# Patient Record
Sex: MALE | URBAN - METROPOLITAN AREA
[De-identification: names, ages, dates, MRNs, and addresses within clinical notes are randomized per-mention and may not be internally consistent; named-entity substitution may affect disease eponyms.]

---

## 2023-06-23 ASSESSMENT — ENCOUNTER SYMPTOMS
CONSTIPATION: 0
VOMITING: 0
COUGH: 0
WHEEZING: 0
SHORTNESS OF BREATH: 0
ABDOMINAL PAIN: 0
NAUSEA: 0
ABDOMINAL DISTENTION: 0
PHOTOPHOBIA: 0
DIARRHEA: 0

## 2023-06-26 ENCOUNTER — OFFICE VISIT (OUTPATIENT)
Age: 58
End: 2023-06-26
Payer: COMMERCIAL

## 2023-06-26 VITALS
DIASTOLIC BLOOD PRESSURE: 74 MMHG | HEIGHT: 69 IN | SYSTOLIC BLOOD PRESSURE: 138 MMHG | WEIGHT: 186 LBS | BODY MASS INDEX: 27.55 KG/M2 | HEART RATE: 70 BPM

## 2023-06-26 DIAGNOSIS — Z95.1 HISTORY OF CORONARY ARTERY BYPASS GRAFT X 3: ICD-10-CM

## 2023-06-26 DIAGNOSIS — E78.5 DYSLIPIDEMIA: ICD-10-CM

## 2023-06-26 DIAGNOSIS — I25.10 CORONARY ARTERY DISEASE INVOLVING NATIVE CORONARY ARTERY OF NATIVE HEART WITHOUT ANGINA PECTORIS: Primary | ICD-10-CM

## 2023-06-26 DIAGNOSIS — R07.89 ATYPICAL CHEST PAIN: ICD-10-CM

## 2023-06-26 DIAGNOSIS — R53.82 CHRONIC FATIGUE: ICD-10-CM

## 2023-06-26 PROCEDURE — 99204 OFFICE O/P NEW MOD 45 MIN: CPT | Performed by: INTERNAL MEDICINE

## 2023-06-26 PROCEDURE — 93000 ELECTROCARDIOGRAM COMPLETE: CPT | Performed by: INTERNAL MEDICINE

## 2023-06-26 RX ORDER — NITROGLYCERIN 0.4 MG/1
TABLET SUBLINGUAL
COMMUNITY
Start: 2023-04-26

## 2023-06-26 RX ORDER — FAMOTIDINE 40 MG/1
40 TABLET, FILM COATED ORAL
Qty: 30 TABLET | Refills: 11 | Status: SHIPPED | OUTPATIENT
Start: 2023-06-26

## 2023-06-26 RX ORDER — CLOPIDOGREL BISULFATE 75 MG/1
75 TABLET ORAL DAILY
COMMUNITY
Start: 2023-05-23

## 2023-06-26 RX ORDER — METOPROLOL SUCCINATE 50 MG/1
TABLET, EXTENDED RELEASE ORAL
COMMUNITY
Start: 2023-06-05

## 2023-06-26 RX ORDER — OMEPRAZOLE 40 MG/1
CAPSULE, DELAYED RELEASE ORAL
COMMUNITY
Start: 2023-05-23

## 2023-06-26 RX ORDER — ASPIRIN 81 MG/1
81 TABLET, CHEWABLE ORAL DAILY
COMMUNITY
Start: 2012-01-17

## 2023-06-26 RX ORDER — ROSUVASTATIN CALCIUM 40 MG/1
TABLET, COATED ORAL
COMMUNITY
Start: 2023-04-26

## 2023-06-26 ASSESSMENT — ENCOUNTER SYMPTOMS: CHEST TIGHTNESS: 1

## 2023-07-24 DIAGNOSIS — I25.10 CORONARY ARTERY DISEASE INVOLVING NATIVE CORONARY ARTERY OF NATIVE HEART WITHOUT ANGINA PECTORIS: ICD-10-CM

## 2023-07-24 DIAGNOSIS — Z95.1 HISTORY OF CORONARY ARTERY BYPASS GRAFT X 3: ICD-10-CM

## 2023-07-24 DIAGNOSIS — E78.5 DYSLIPIDEMIA: ICD-10-CM

## 2023-07-24 LAB
ALBUMIN SERPL-MCNC: 4.1 G/DL (ref 3.2–4.6)
ALBUMIN/GLOB SERPL: 1.3 (ref 0.4–1.6)
ALP SERPL-CCNC: 55 U/L (ref 50–136)
ALT SERPL-CCNC: 33 U/L (ref 12–65)
AST SERPL-CCNC: 23 U/L (ref 15–37)
BILIRUB DIRECT SERPL-MCNC: 0.1 MG/DL
BILIRUB SERPL-MCNC: 0.4 MG/DL (ref 0.2–1.1)
CHOLEST SERPL-MCNC: 146 MG/DL
GLOBULIN SER CALC-MCNC: 3.2 G/DL (ref 2.8–4.5)
HDLC SERPL-MCNC: 48 MG/DL (ref 40–60)
HDLC SERPL: 3
LDLC SERPL CALC-MCNC: 54.2 MG/DL
PROT SERPL-MCNC: 7.3 G/DL (ref 6.3–8.2)
TRIGL SERPL-MCNC: 219 MG/DL (ref 35–150)
VLDLC SERPL CALC-MCNC: 43.8 MG/DL (ref 6–23)

## 2023-07-25 ENCOUNTER — TELEPHONE (OUTPATIENT)
Age: 58
End: 2023-07-25

## 2023-07-26 NOTE — TELEPHONE ENCOUNTER
Per Dr. Marina Mitchell, \"Normal heart function and normal blood flow to the heart.   Good news \"    Called pt and informed him of results

## 2023-08-07 ENCOUNTER — TELEPHONE (OUTPATIENT)
Age: 58
End: 2023-08-07

## 2023-08-07 NOTE — TELEPHONE ENCOUNTER
----- Message from Sarah Bonilla MD sent at 8/6/2023 11:49 AM EDT -----  Lipids and liver look good but triglycerides a bit too high. Eat low-carb/low sugar diet, continue to exercise as tolerated, add over-the-counter fish oil in the morning and in the evening and recheck fasting lipids in 3 months.   Keep routine follow-up  ----- Message -----  From: Lyn Morales Incoming Bryant [employer]  Sent: 7/24/2023   3:42 PM EDT  To: Sarah Bonilla MD

## 2023-08-13 ASSESSMENT — ENCOUNTER SYMPTOMS
VOMITING: 0
NAUSEA: 0
PHOTOPHOBIA: 0
SHORTNESS OF BREATH: 0
CONSTIPATION: 0
WHEEZING: 0
COUGH: 0
ABDOMINAL PAIN: 0
DIARRHEA: 0
ABDOMINAL DISTENTION: 0

## 2023-08-13 NOTE — PROGRESS NOTES
[facility] CARDIOLOGY  [address] [facility], [place]  PHONE: [phone]        NAME:  Beatriz Cunha  : 1965  MRN: [id number]     PCP:  Wiliam De Luna MD    SUBJECTIVE:   Beatriz Cunha is a 59 y.o. male seen for a consultation visit regarding the following:     Chief Complaint   Patient presents with    Results     NUKE RESULTS    Coronary Artery Disease        HPI:    He presented recently to establish new cardiac care, previously cared for by Dr. Ludmila Marin at [facility] with a history of bypass grafting and recent stenting last month as noted below. He had bypass 2021 in [facility] with recent PCI in the setting of worsening chest discomfort. He has had prior stenting in . He recently saw Dr. Sierra Marrero with a 4 to 5 months history of accelerating angina and proceeded with left heart catheterization as noted below. Left heart catheterization [facility] 2023:    Severe MVCAD, post bypass anatomy. Severe proximal LCx lesion 70%    Successful PCI with LEORA to the proximal LCx with good result   Right radial artery graft to second marginal small and atretic  LIMA to distal LAD patent  Saphenous vein graft to right-sided posterior descending branch patent    LVEDP 12 mmHg     Dieting and exercising 4-5x weekly since the PCI but still having intermittent SSCP from time to time without associated exacerbating or alleviating factors. He's had a couple of episodes of CP at rest and when in bed, burping and belching with improvement. He is worried about his chest discomfort but this sounds atypical and most likely GI in nature. He has worsening chronic fatigue, daytime somnolence, and his wife reports loud snoring and he reports poor sleep with frequent awakening. He has never had a sleep study. Nuclear stress test 2023:    Stress Combined Conclusion: Normal treadmill myocardial perfusion study at 89 %% PHR.  Findings suggest a

## 2023-08-14 ENCOUNTER — OFFICE VISIT (OUTPATIENT)
Dept: FAMILY MEDICINE CLINIC | Facility: CLINIC | Age: 58
End: 2023-08-14
Payer: COMMERCIAL

## 2023-08-14 VITALS
WEIGHT: 191 LBS | SYSTOLIC BLOOD PRESSURE: 142 MMHG | HEIGHT: 69 IN | DIASTOLIC BLOOD PRESSURE: 90 MMHG | BODY MASS INDEX: 28.29 KG/M2 | HEART RATE: 68 BPM

## 2023-08-14 DIAGNOSIS — F51.04 CHRONIC INSOMNIA: ICD-10-CM

## 2023-08-14 DIAGNOSIS — J30.2 SEASONAL ALLERGIES: ICD-10-CM

## 2023-08-14 DIAGNOSIS — I25.10 CORONARY ARTERY DISEASE INVOLVING NATIVE CORONARY ARTERY OF NATIVE HEART WITHOUT ANGINA PECTORIS: Primary | ICD-10-CM

## 2023-08-14 DIAGNOSIS — M17.11 PRIMARY OSTEOARTHRITIS OF RIGHT KNEE: ICD-10-CM

## 2023-08-14 DIAGNOSIS — Z95.1 HISTORY OF CORONARY ARTERY BYPASS GRAFT X 3: ICD-10-CM

## 2023-08-14 DIAGNOSIS — E78.5 DYSLIPIDEMIA: ICD-10-CM

## 2023-08-14 DIAGNOSIS — Z12.5 SCREENING FOR PROSTATE CANCER: ICD-10-CM

## 2023-08-14 DIAGNOSIS — K21.9 GASTROESOPHAGEAL REFLUX DISEASE WITHOUT ESOPHAGITIS: ICD-10-CM

## 2023-08-14 LAB — PSA SERPL-MCNC: 0.9 NG/ML

## 2023-08-14 PROCEDURE — 99204 OFFICE O/P NEW MOD 45 MIN: CPT | Performed by: FAMILY MEDICINE

## 2023-08-14 RX ORDER — CETIRIZINE HYDROCHLORIDE 10 MG/1
10 TABLET ORAL DAILY
Qty: 90 TABLET | Refills: 3 | Status: SHIPPED | OUTPATIENT
Start: 2023-08-14

## 2023-08-14 RX ORDER — TRAZODONE HYDROCHLORIDE 50 MG/1
50 TABLET ORAL NIGHTLY
Qty: 30 TABLET | Refills: 3 | Status: SHIPPED | OUTPATIENT
Start: 2023-08-14

## 2023-08-14 SDOH — ECONOMIC STABILITY: INCOME INSECURITY: HOW HARD IS IT FOR YOU TO PAY FOR THE VERY BASICS LIKE FOOD, HOUSING, MEDICAL CARE, AND HEATING?: PATIENT DECLINED

## 2023-08-14 SDOH — ECONOMIC STABILITY: FOOD INSECURITY: WITHIN THE PAST 12 MONTHS, THE FOOD YOU BOUGHT JUST DIDN'T LAST AND YOU DIDN'T HAVE MONEY TO GET MORE.: PATIENT DECLINED

## 2023-08-14 SDOH — ECONOMIC STABILITY: FOOD INSECURITY: WITHIN THE PAST 12 MONTHS, YOU WORRIED THAT YOUR FOOD WOULD RUN OUT BEFORE YOU GOT MONEY TO BUY MORE.: PATIENT DECLINED

## 2023-08-14 SDOH — ECONOMIC STABILITY: HOUSING INSECURITY
IN THE LAST 12 MONTHS, WAS THERE A TIME WHEN YOU DID NOT HAVE A STEADY PLACE TO SLEEP OR SLEPT IN A SHELTER (INCLUDING NOW)?: PATIENT REFUSED

## 2023-08-14 ASSESSMENT — ENCOUNTER SYMPTOMS
SINUS PRESSURE: 0
VOMITING: 0
EYE PAIN: 0
COLOR CHANGE: 0
ABDOMINAL PAIN: 1
EYE DISCHARGE: 0
RHINORRHEA: 0
WHEEZING: 0
SORE THROAT: 0
STRIDOR: 0
EYE REDNESS: 0
BLOOD IN STOOL: 0
SHORTNESS OF BREATH: 0
CHEST TIGHTNESS: 0
SINUS PAIN: 0
DIARRHEA: 0
CONSTIPATION: 0
COUGH: 0
NAUSEA: 0
ABDOMINAL DISTENTION: 0
EYE ITCHING: 0
FACIAL SWELLING: 0
BACK PAIN: 0

## 2023-08-14 ASSESSMENT — PATIENT HEALTH QUESTIONNAIRE - PHQ9
SUM OF ALL RESPONSES TO PHQ QUESTIONS 1-9: 0
SUM OF ALL RESPONSES TO PHQ9 QUESTIONS 1 & 2: 0
1. LITTLE INTEREST OR PLEASURE IN DOING THINGS: 0
SUM OF ALL RESPONSES TO PHQ QUESTIONS 1-9: 0
2. FEELING DOWN, DEPRESSED OR HOPELESS: 0

## 2023-08-14 NOTE — PROGRESS NOTES
salt to cooked food), reduce red meat, butter, mayonnaise, egg yolks [<1 egg yolk daily] and dairy products, increase daily exercise [at least 30 sustained minutes 3-5 times weekly])             Relevant Medications    metoprolol succinate (TOPROL XL) 50 MG extended release tablet    rosuvastatin (CRESTOR) 40 MG tablet    nitroGLYCERIN (NITROSTAT) 0.4 MG SL tablet    aspirin 81 MG chewable tablet       Musculoskeletal and Integument    Primary osteoarthritis of right knee     Given hx, will need to see ortho. Relevant Medications    aspirin 81 MG chewable tablet    Other Relevant Orders    [facility] [employer] [facility]       Other    History of coronary artery bypass graft x 3     Asymptomatic today. Will follow along with cards. Dyslipidemia      Drop in LDL. Encouraged strict dietary compliance. Chronic insomnia      New problem. Discussed tx. Pt agrees to trial on trazodone. Relevant Medications    traZODone (DESYREL) 50 MG tablet    Seasonal allergies      New problem. Start daily zyrtec. Consider seasonal singulair.             Relevant Medications    cetirizine (ZYRTEC) 10 MG tablet        Ksenia Flannery III, MD

## 2023-08-14 NOTE — ASSESSMENT & PLAN NOTE
Encouraged compliance with medications.  Stressed importance of low sodium and low fat diet (limit processed foods, fast foods and avoid adding salt to cooked food), reduce red meat, butter, mayonnaise, egg yolks [<1 egg yolk daily] and dairy products, increase daily exercise [at least 30 sustained minutes 3-5 times weekly])

## 2023-08-15 ENCOUNTER — TELEPHONE (OUTPATIENT)
Dept: FAMILY MEDICINE CLINIC | Facility: CLINIC | Age: 58
End: 2023-08-15

## 2023-08-15 NOTE — TELEPHONE ENCOUNTER
Phone call to Bright Richards @[phone] to request colonoscopy results and pathology. Spoke with primo and she said she will fax them over.

## 2023-08-16 ENCOUNTER — OFFICE VISIT (OUTPATIENT)
Age: 58
End: 2023-08-16
Payer: COMMERCIAL

## 2023-08-16 VITALS
HEIGHT: 69 IN | DIASTOLIC BLOOD PRESSURE: 84 MMHG | BODY MASS INDEX: 27.99 KG/M2 | HEART RATE: 76 BPM | SYSTOLIC BLOOD PRESSURE: 128 MMHG | WEIGHT: 189 LBS

## 2023-08-16 DIAGNOSIS — R07.89 ATYPICAL CHEST PAIN: ICD-10-CM

## 2023-08-16 DIAGNOSIS — I25.10 CORONARY ARTERY DISEASE INVOLVING NATIVE CORONARY ARTERY OF NATIVE HEART WITHOUT ANGINA PECTORIS: Primary | ICD-10-CM

## 2023-08-16 DIAGNOSIS — Z95.1 HISTORY OF CORONARY ARTERY BYPASS GRAFT X 3: ICD-10-CM

## 2023-08-16 DIAGNOSIS — E78.5 DYSLIPIDEMIA: ICD-10-CM

## 2023-08-16 PROCEDURE — 99214 OFFICE O/P EST MOD 30 MIN: CPT | Performed by: INTERNAL MEDICINE

## 2023-08-16 ASSESSMENT — ENCOUNTER SYMPTOMS: CHEST TIGHTNESS: 0

## 2023-08-21 ENCOUNTER — TELEPHONE (OUTPATIENT)
Dept: ORTHOPEDIC SURGERY | Age: 58
End: 2023-08-21

## 2023-08-23 ENCOUNTER — HOSPITAL ENCOUNTER (OUTPATIENT)
Dept: GENERAL RADIOLOGY | Age: 58
Discharge: HOME | End: 2023-08-25
Payer: COMMERCIAL

## 2023-08-23 ENCOUNTER — OFFICE VISIT (OUTPATIENT)
Dept: FAMILY MEDICINE CLINIC | Facility: CLINIC | Age: 58
End: 2023-08-23
Payer: COMMERCIAL

## 2023-08-23 VITALS
SYSTOLIC BLOOD PRESSURE: 128 MMHG | HEIGHT: 69 IN | BODY MASS INDEX: 28.58 KG/M2 | WEIGHT: 193 LBS | DIASTOLIC BLOOD PRESSURE: 70 MMHG

## 2023-08-23 DIAGNOSIS — M25.571 ACUTE RIGHT ANKLE PAIN: ICD-10-CM

## 2023-08-23 DIAGNOSIS — M25.571 ACUTE RIGHT ANKLE PAIN: Primary | ICD-10-CM

## 2023-08-23 PROCEDURE — 73610 X-RAY EXAM OF ANKLE: CPT

## 2023-08-23 PROCEDURE — 99214 OFFICE O/P EST MOD 30 MIN: CPT | Performed by: FAMILY MEDICINE

## 2023-08-23 RX ORDER — TRAMADOL HYDROCHLORIDE 50 MG/1
50-100 TABLET ORAL EVERY 6 HOURS PRN
Qty: 18 TABLET | Refills: 0 | Status: SHIPPED | OUTPATIENT
Start: 2023-08-23 | End: 2023-08-26

## 2023-08-23 ASSESSMENT — ENCOUNTER SYMPTOMS
ABDOMINAL PAIN: 0
STRIDOR: 0
RHINORRHEA: 0
CHEST TIGHTNESS: 0
FACIAL SWELLING: 0
VOMITING: 0
BACK PAIN: 0
NAUSEA: 0
CONSTIPATION: 0
WHEEZING: 0
EYE ITCHING: 0
BLOOD IN STOOL: 0
SORE THROAT: 0
EYE PAIN: 0
COLOR CHANGE: 0
SINUS PAIN: 0
SINUS PRESSURE: 0
EYE DISCHARGE: 0
DIARRHEA: 0
SHORTNESS OF BREATH: 0
EYE REDNESS: 0
COUGH: 0
ABDOMINAL DISTENTION: 0

## 2023-08-23 NOTE — PROGRESS NOTES
[facility]  _______________________________________  Hussain Adams MD                 [address], [address] [place] [address]                                                                                    Phone: [phone]                                                                                    Fax: [phone]    Nessa Rosas is a 59 y.o. male who is seen for evaluation of   Chief Complaint   Patient presents with    Foot Pain     Right foot/heal pain X 2 days, no injury known        HPI:   C/o R ankle pain and swelling x 2 days. Pt had a w/o in the gym and mowed the grass prior to onset, but denies any known injuries. He has tried ice that did not resolve the pain or swelling. Worse with getting out of bed and walking. Denies any radiation of pain. Tenderness largely localized to posterior calcaneus at achilles insertion. Review of Systems:  Review of Systems   Constitutional:  Negative for activity change, appetite change, chills, diaphoresis, fatigue, fever and unexpected weight change. HENT:  Negative for congestion, ear discharge, ear pain, facial swelling, hearing loss, mouth sores, nosebleeds, postnasal drip, rhinorrhea, sinus pressure, sinus pain, sore throat and tinnitus. Eyes:  Negative for pain, discharge, redness, itching and visual disturbance. Respiratory:  Negative for cough, chest tightness, shortness of breath, wheezing and stridor. Cardiovascular:  Negative for chest pain, palpitations and leg swelling. Gastrointestinal:  Negative for abdominal distention, abdominal pain, blood in stool, constipation, diarrhea, nausea and vomiting. Endocrine: Negative for cold intolerance, heat intolerance, polydipsia, polyphagia and polyuria. Genitourinary:  Negative for decreased urine volume, difficulty urinating, dysuria, flank pain, frequency, hematuria and urgency.    Musculoskeletal:  Positive

## 2023-08-23 NOTE — ASSESSMENT & PLAN NOTE
Likely a tendinopathy. Check xray. No h/o gout. Will need to consider MRI pending xray, though he has recently had a coronary stent. Instructed to use ACE wrap prn, apply ice for 20 mins prn, tylenol for additional pain relief and will given 3 days of tramadol for severe pain. PDMP queried.

## 2023-08-28 ENCOUNTER — NURSE ONLY (OUTPATIENT)
Dept: FAMILY MEDICINE CLINIC | Facility: CLINIC | Age: 58
End: 2023-08-28

## 2023-08-28 ENCOUNTER — TELEPHONE (OUTPATIENT)
Dept: FAMILY MEDICINE CLINIC | Facility: CLINIC | Age: 58
End: 2023-08-28

## 2023-08-28 DIAGNOSIS — M25.571 ACUTE RIGHT ANKLE PAIN: ICD-10-CM

## 2023-08-28 DIAGNOSIS — M25.571 ACUTE RIGHT ANKLE PAIN: Primary | ICD-10-CM

## 2023-08-28 NOTE — TELEPHONE ENCOUNTER
Pts wife, Delmi Mak, called concerned that Adron Six foot/ankle pain is turning into gout. MRI is scheduled for tomorrow. Per Dr Melvin Diaz, pt was advised to come right over this AM to have a Uric Acid level drawn. Once the results are back we will call pt right away but pt was advised to still keep the MRI just incase. Pt wife expressed verbal understanding.

## 2023-08-29 ENCOUNTER — TELEPHONE (OUTPATIENT)
Dept: FAMILY MEDICINE CLINIC | Facility: CLINIC | Age: 58
End: 2023-08-29

## 2023-08-29 DIAGNOSIS — E79.0 ELEVATED URIC ACID IN BLOOD: ICD-10-CM

## 2023-08-29 DIAGNOSIS — M25.571 ACUTE RIGHT ANKLE PAIN: Primary | ICD-10-CM

## 2023-08-29 LAB — URATE SERPL-MCNC: 7.4 MG/DL (ref 2.6–6)

## 2023-08-29 RX ORDER — COLCHICINE 0.6 MG/1
TABLET ORAL
Qty: 3 TABLET | Refills: 0 | Status: SHIPPED | OUTPATIENT
Start: 2023-08-29

## 2023-08-29 RX ORDER — INDOMETHACIN 50 MG/1
50 CAPSULE ORAL 2 TIMES DAILY PRN
Qty: 10 CAPSULE | Refills: 0 | Status: SHIPPED | OUTPATIENT
Start: 2023-08-29

## 2023-08-29 RX ORDER — PREDNISONE 10 MG/1
30 TABLET ORAL DAILY
Qty: 12 TABLET | Refills: 0 | Status: SHIPPED | OUTPATIENT
Start: 2023-08-29 | End: 2023-09-02

## 2023-08-29 NOTE — TELEPHONE ENCOUNTER
Spoke with pt regarding mildly elevated uric acid. Pt admits that he recently was on vacation and consumed considerably more EtOH than his baseline and has since considerably reduced that. States his pain and swelling are unchanged, but there is purple discoloration at site of achilles insertion. MRI is scheduled for tomorrow. Advised my suspicion is that this is likely a tendinoplathy rather than gout, but given the UA results, will tx as such an instructed to delay mri for another 2 days pending response to tx. I advised to proceed with mri if no improvement in 48 hrs. Pt verbalized understanding.

## 2023-08-31 ENCOUNTER — HOSPITAL ENCOUNTER (OUTPATIENT)
Age: 58
Discharge: HOME | End: 2023-09-02
Payer: COMMERCIAL

## 2023-08-31 DIAGNOSIS — M25.571 ACUTE RIGHT ANKLE PAIN: ICD-10-CM

## 2023-08-31 PROCEDURE — 73721 MRI JNT OF LWR EXTRE W/O DYE: CPT

## 2023-09-01 ENCOUNTER — OFFICE VISIT (OUTPATIENT)
Dept: ORTHOPEDIC SURGERY | Age: 58
End: 2023-09-01

## 2023-09-01 DIAGNOSIS — R52 PAIN: Primary | ICD-10-CM

## 2023-09-01 NOTE — PROGRESS NOTES
Name: Maddy Ramírez  YOB: 1965  Gender: male  MRN: [id number]    Summary: Right insertion achilles tendonitis -medial sided. Suspect gout attack as well. Incidental peroneal tendon brevis tear. , not symptomatic. At this point I do not think there is much to do. I think he has been treated already for his gout. I will see him back in 2 months if needed without x-rays. CC: right Heel pain     HPI: Alayna Hubbard is a 59 y.o. male who presents with increasing posterior ankle pain located at the Achilles tendon region. They do not remember any acute event that started. It has been going on for approximately 2 weeks and has gotten worse. The pain as a burning tearing pain, tender to palpation and pressure with shoe wear, and it limits daily activities. I also described some swelling in this region also. He saw his primary care doctor who ordered uric acid and treating for gout with a prednisone Dosepak. He improved after this but still continued to have pain. MRI was ordered which revealed a peroneal tendon tear. He is referred to me. He describes primarily posterior medial ankle pain. He states he is better than he was 34 was treated for this issue and he states things are actually getting better now. ROS/Meds/PSH/PMH/FH/SH: below is tobacco and diabetes. A full history is at the bottom of the note. Patient Denies fever/chills, headache, visual changes, chest pain, shortness of breath, and nausea/vomiting/diarrhea     Tobacco:  reports that he has never smoked. He has never used smokeless tobacco.  Recent cardiac disease    Physical Examination:  Gen: AAOx3 NAD  Resp: CTAB - no wheezing  Card: RRR  Eyes: sclera non icteric    right Lower Extremity: FROM actively of toes, foot, ankle, knee and hip. No instability of foot or ankle with drawer and stress. 5/5 strength to TA/EHL/GSC/Peroneals/PTib. No skin lesions, Non TTP throughout.   2+ dp pulse w/

## 2023-09-01 NOTE — PROGRESS NOTES
Patient was fitted and instructed on bilateral heel lifts. Patient read and signed documenting they understand and agree to [facility]'s current DME return policy.

## 2023-09-06 ENCOUNTER — OFFICE VISIT (OUTPATIENT)
Dept: ORTHOPEDIC SURGERY | Age: 58
End: 2023-09-06

## 2023-09-06 DIAGNOSIS — M25.561 RIGHT KNEE PAIN, UNSPECIFIED CHRONICITY: Primary | ICD-10-CM

## 2023-09-06 NOTE — PROGRESS NOTES
Name: Andrey Ramírez  YOB: 1965  Gender: male  MRN: [id number]    CC:   Chief Complaint   Patient presents with    Knee Pain     Right knee-getting xrays today         DIAGNOSIS:   Encounter Diagnosis   Name Primary? Right knee pain, unspecified chronicity Yes        HPI:   The pain has been present for years and is becoming worse. It hurts not too much at night when sleeping. The pain is located over the knee. The medial joint line  It does hurt to walk and gets worse with increased distances. The pain does not radiate down the leg. Numbness and tingling are not noted. Treatment so far has been to be injections and anti-inflammatory medications. Nearing 6 months post stent for cardiac issues      Current Outpatient Medications:     traMADol (ULTRAM) 50 MG tablet, Take 2 tablets by mouth every 6 hours as needed for Pain for up to 5 days. Intended supply: 3 days. Take lowest dose possible to manage pain Max Daily Amount: 400 mg, Disp: 40 tablet, Rfl: 0    colchicine (COLCRYS) 0.6 MG tablet, Take 2 tabs now, followed by 1 tab 1 hr later, then stop., Disp: 3 tablet, Rfl: 0    indomethacin (INDOCIN) 50 MG capsule, Take 1 capsule by mouth 2 times daily as needed for Pain (heel pain), Disp: 10 capsule, Rfl: 0    Coenzyme Q10 (COQ10 PO), Take by mouth, Disp: , Rfl:     traZODone (DESYREL) 50 MG tablet, Take 1 tablet by mouth nightly, Disp: 30 tablet, Rfl: 3    cetirizine (ZYRTEC) 10 MG tablet, Take 1 tablet by mouth daily, Disp: 90 tablet, Rfl: 3    metoprolol succinate (TOPROL XL) 50 MG extended release tablet, Take 1 tablet by mouth daily, Disp: , Rfl:     rosuvastatin (CRESTOR) 40 MG tablet, Take 1 tablet by mouth daily, Disp: , Rfl:     omeprazole (PRILOSEC) 40 MG delayed release capsule, , Disp: , Rfl:     nitroGLYCERIN (NITROSTAT) 0.4 MG SL tablet, PLACE ONE TABLET UNDER THE TONGUE AT ONSET OF CHEST PAIN.  MAY REPEAT EVERY 5 MINUTES AS NEEDED FOR CHEST PAIN UP TO 3 TABLETS IN 15

## 2023-09-07 DIAGNOSIS — M25.561 RIGHT KNEE PAIN, UNSPECIFIED CHRONICITY: Primary | ICD-10-CM

## 2023-09-08 ENCOUNTER — TELEPHONE (OUTPATIENT)
Age: 58
End: 2023-09-08

## 2023-09-08 NOTE — TELEPHONE ENCOUNTER
----- Message from Asif Dilloniden, [address] sent at 9/8/2023  8:49 AM EDT -----  Regarding: FW: Hank   Contact: [phone]    ----- Message -----  From: Sheryle Goldberg  Sent: 9/7/2023   5:21 PM EDT  To: Harini Magdaleno Cardiology Clinical Staff  Subject: Gas                                              Well I saw Dr Geovani Jones about this problem because I was getting chest pains.  He prescribed famiotidine and the pains subsided but now they have come back and wanted to know if he wanted to up the dosage because I have been uncomfortable with the on and off pains

## 2023-09-08 NOTE — TELEPHONE ENCOUNTER
,  He sent a Vadio message about gas. I deferred to PCP but he says you have been treating him for the gas w/Pepcid 40mg qhs. Do you want to increase the dose? Defer to PCP? Change the med?

## 2023-09-08 NOTE — TELEPHONE ENCOUNTER
Libby Griffin MD  You 6 minutes ago (12:30 PM)       Increase to twice daily dosing over the weekend but if still having problems on Monday he needs to discuss with his PCP.          Note

## 2023-09-08 NOTE — TELEPHONE ENCOUNTER
Increase to twice daily dosing over the weekend but if still having problems on Monday he needs to discuss with his PCP.

## 2023-09-20 PROBLEM — M17.11 OSTEOARTHRITIS OF RIGHT KNEE: Status: ACTIVE | Noted: 2023-09-07

## 2023-09-30 DIAGNOSIS — F51.04 CHRONIC INSOMNIA: ICD-10-CM

## 2023-10-02 RX ORDER — TRAZODONE HYDROCHLORIDE 50 MG/1
50 TABLET ORAL
Qty: 30 TABLET | Refills: 3 | OUTPATIENT
Start: 2023-10-02

## 2023-10-17 ENCOUNTER — TELEPHONE (OUTPATIENT)
Dept: ORTHOPEDIC SURGERY | Age: 58
End: 2023-10-17

## 2023-10-18 ENCOUNTER — OFFICE VISIT (OUTPATIENT)
Dept: ORTHOPEDIC SURGERY | Age: 58
End: 2023-10-18
Payer: COMMERCIAL

## 2023-10-18 DIAGNOSIS — M17.11 PRIMARY OSTEOARTHRITIS OF RIGHT KNEE: Primary | ICD-10-CM

## 2023-10-18 PROCEDURE — 99215 OFFICE O/P EST HI 40 MIN: CPT | Performed by: ORTHOPAEDIC SURGERY

## 2023-10-18 NOTE — PROGRESS NOTES
Name: Melly Ramírez  YOB: 1965  Gender: male  MRN: [id number]    CC: Right knee pain/transfer care from Dr. Latoya Jaime    HPI: Rasheeda Valentin is a 59 y.o. male who presents with longstanding progressive right knee pain. He describes difficulty getting up and down a sense of insecurity with the knee. He has trouble with stair climbing and other measures. He has had injection therapies in the past without a whole lot of relief. He saw Dr. Latoya Jaime recently with progressive symptomatology and had knee replacement recommended. He was scheduled for Dominique Srinivas robot assisted surgery. He does have a history of coronary artery stenting done in early May at [facility] cardiology. He has had a good recovery from this but does express concern about his cardiac clearance at this time. Is on Plavix    History was obtained from patient    ROS/Meds/PSH/PMH/FH/SH: I personally reviewed the patients standard intake form. Below are the pertinents    Tobacco:  reports that he has never smoked. He has never used smokeless tobacco.  No past medical history on file. Past Surgical History:   Procedure Laterality Date    COLONOSCOPY W/ BIOPSIES N/A     2 yrs ago- unsure of recall    CORONARY ANGIOPLASTY WITH STENT PLACEMENT N/A     3, last 1 month ago. Physical Examination:  Physical exam: On examination of the patient's gait there is antalgia in stance on the right side. and there is varus deformity in the right knee    On examination while standing there is decreased flexion in the lumbosacral spine without acute list or spasm. While seated ,  the Bilateral hip is examined there is full range of motion without obvious issue . On examination of the  Right knee :ROM is 0 to 125 The knee is in varus which corrects with valgus stress to some degree, There is significant tenderness to direct palpation, and There is a mild effusion.  On examination of the  Left knee :ROM is 0 to 125

## 2023-10-19 ENCOUNTER — TELEPHONE (OUTPATIENT)
Age: 58
End: 2023-10-19

## 2023-10-19 NOTE — TELEPHONE ENCOUNTER
Low to moderate risk  Had stenting to LCX May 9th. Nearly 6mo s/p PCI and asymptomatic. Would be best if could wait till mid to late November given guideline recs to wait 6 mos post LEORA PCI, but most likely OK to proceed on 10/27 if he wishes. OK to hold plavix 5-7 days but continue ASA thru procedure without interruption.  Resume plavix post op ASAP, defer to ortho MD

## 2023-10-19 NOTE — TELEPHONE ENCOUNTER
Cardiac Clearance        Physician or Practice Requesting: Dr Jayro Ordoñez: Shania Hart Phone Number: [phone]  Fax Number: [phone]  Date of Surgery/Procedure: 10/27/23  Type of Surgery or Procedure: Rt Total Knee  Type of Anesthesia: Spinal  Type of Clearance Requested: Cardiac Clearance and Medication Hold  Medication to Hold: Plavis  Days to Hold: 7 days prior

## 2023-10-24 ENCOUNTER — HOSPITAL ENCOUNTER (OUTPATIENT)
Dept: REHABILITATION | Age: 58
Discharge: HOME | End: 2023-10-27
Payer: COMMERCIAL

## 2023-10-24 ENCOUNTER — HOSPITAL ENCOUNTER (OUTPATIENT)
Dept: SURGERY | Age: 58
Discharge: HOME | End: 2023-10-27
Payer: COMMERCIAL

## 2023-10-24 VITALS
TEMPERATURE: 98.1 F | DIASTOLIC BLOOD PRESSURE: 81 MMHG | OXYGEN SATURATION: 98 % | WEIGHT: 190 LBS | HEIGHT: 69 IN | BODY MASS INDEX: 28.14 KG/M2 | HEART RATE: 68 BPM | SYSTOLIC BLOOD PRESSURE: 140 MMHG

## 2023-10-24 DIAGNOSIS — Z96.651 STATUS POST RIGHT KNEE REPLACEMENT: Primary | ICD-10-CM

## 2023-10-24 LAB
ANION GAP SERPL CALC-SCNC: 9 MMOL/L (ref 2–11)
BUN SERPL-MCNC: 16 MG/DL (ref 8–23)
CALCIUM SERPL-MCNC: 9 MG/DL (ref 8.3–10.4)
CHLORIDE SERPL-SCNC: 105 MMOL/L (ref 101–110)
CO2 SERPL-SCNC: 28 MMOL/L (ref 21–32)
CREAT SERPL-MCNC: 1.06 MG/DL (ref 0.8–1.5)
ERYTHROCYTE [DISTWIDTH] IN BLOOD BY AUTOMATED COUNT: 13.3 % (ref 11.9–14.6)
GLUCOSE SERPL-MCNC: 112 MG/DL (ref 65–100)
HCT VFR BLD AUTO: 42.1 % (ref 41.1–50.3)
HGB BLD-MCNC: 14.2 G/DL (ref 13.6–17.2)
MCH RBC QN AUTO: 29.3 PG (ref 26.1–32.9)
MCHC RBC AUTO-ENTMCNC: 33.7 G/DL (ref 31.4–35)
MCV RBC AUTO: 86.8 FL (ref 82–102)
MRSA DNA SPEC QL NAA+PROBE: NOT DETECTED
NRBC # BLD: 0 K/UL (ref 0–0.2)
PLATELET # BLD AUTO: 312 K/UL (ref 150–450)
PMV BLD AUTO: 8.8 FL (ref 9.4–12.3)
POTASSIUM SERPL-SCNC: 3.8 MMOL/L (ref 3.5–5.1)
RBC # BLD AUTO: 4.85 M/UL (ref 4.23–5.6)
S AUREUS CPE NOSE QL NAA+PROBE: NOT DETECTED
SODIUM SERPL-SCNC: 142 MMOL/L (ref 133–143)
WBC # BLD AUTO: 6.6 K/UL (ref 4.3–11.1)

## 2023-10-24 PROCEDURE — 97161 PT EVAL LOW COMPLEX 20 MIN: CPT

## 2023-10-24 PROCEDURE — 85027 COMPLETE CBC AUTOMATED: CPT

## 2023-10-24 PROCEDURE — 80048 BASIC METABOLIC PNL TOTAL CA: CPT

## 2023-10-24 PROCEDURE — 94760 N-INVAS EAR/PLS OXIMETRY 1: CPT

## 2023-10-24 PROCEDURE — 87641 MR-STAPH DNA AMP PROBE: CPT

## 2023-10-24 ASSESSMENT — KOOS JR
RISING FROM SITTING: 0
GOING UP OR DOWN STAIRS: 2
BENDING TO THE FLOOR TO PICK UP OBJECT: 2
STANDING UPRIGHT: 2
HOW SEVERE IS YOUR KNEE STIFFNESS AFTER FIRST WAKING IN MORNING: 3
STRAIGHTENING KNEE FULLY: 2
KOOS JR TOTAL INTERVAL SCORE: 52.465
TWISING OR PIVOTING ON KNEE: 3

## 2023-10-24 ASSESSMENT — PROMIS GLOBAL HEALTH SCALE
IN GENERAL, HOW WOULD YOU RATE YOUR MENTAL HEALTH, INCLUDING YOUR MOOD AND YOUR ABILITY TO THINK [ON A SCALE OF 1 (POOR) TO 5 (EXCELLENT)]?: 4
WHO IS THE PERSON COMPLETING THE PROMIS V1.1 SURVEY?: 0
TO WHAT EXTENT ARE YOU ABLE TO CARRY OUT YOUR EVERYDAY PHYSICAL ACTIVITIES SUCH AS WALKING, CLIMBING STAIRS, CARRYING GROCERIES, OR MOVING A CHAIR [ON A SCALE OF 1 (NOT AT ALL) TO 5 (COMPLETELY)]?: 3
IN GENERAL, HOW WOULD YOU RATE YOUR PHYSICAL HEALTH [ON A SCALE OF 1 (POOR) TO 5 (EXCELLENT)]?: 3
IN THE PAST 7 DAYS, HOW OFTEN HAVE YOU BEEN BOTHERED BY EMOTIONAL PROBLEMS, SUCH AS FEELING ANXIOUS, DEPRESSED, OR IRRITABLE [ON A SCALE FROM 1 (NEVER) TO 5 (ALWAYS)]?: 3
SUM OF RESPONSES TO QUESTIONS 2, 4, 5, & 10: 15
SUM OF RESPONSES TO QUESTIONS 3, 6, 7, & 8: 15
IN GENERAL, WOULD YOU SAY YOUR HEALTH IS...[ON A SCALE OF 1 (POOR) TO 5 (EXCELLENT)]: 3
IN GENERAL, PLEASE RATE HOW WELL YOU CARRY OUT YOUR USUAL SOCIAL ACTIVITIES (INCLUDES ACTIVITIES AT HOME, AT WORK, AND IN YOUR COMMUNITY, AND RESPONSIBILITIES AS A PARENT, CHILD, SPOUSE, EMPLOYEE, FRIEND, ETC) [ON A SCALE OF 1 (POOR) TO 5 (EXCELLENT)]?: 4
HOW IS THE PROMIS V1.1 BEING ADMINISTERED?: 0
IN GENERAL, HOW WOULD YOU RATE YOUR SATISFACTION WITH YOUR SOCIAL ACTIVITIES AND RELATIONSHIPS [ON A SCALE OF 1 (POOR) TO 5 (EXCELLENT)]?: 4
IN GENERAL, WOULD YOU SAY YOUR QUALITY OF LIFE IS...[ON A SCALE OF 1 (POOR) TO 5 (EXCELLENT)]: 4
IN THE PAST 7 DAYS, HOW WOULD YOU RATE YOUR PAIN ON AVERAGE [ON A SCALE FROM 0 (NO PAIN) TO 10 (WORST IMAGINABLE PAIN)]?: 5
IN THE PAST 7 DAYS, HOW WOULD YOU RATE YOUR FATIGUE ON AVERAGE [ON A SCALE FROM 1 (NONE) TO 5 (VERY SEVERE)]?: 4

## 2023-10-24 ASSESSMENT — PAIN DESCRIPTION - LOCATION: LOCATION: KNEE

## 2023-10-24 ASSESSMENT — PAIN DESCRIPTION - DESCRIPTORS: DESCRIPTORS: ACHING;BURNING;SHARP

## 2023-10-24 ASSESSMENT — PAIN SCALES - GENERAL: PAINLEVEL_OUTOF10: 9

## 2023-10-24 ASSESSMENT — PULMONARY FUNCTION TESTS
FEV1 (%PREDICTED): 89
FEV1 (LITERS): 2.93

## 2023-10-24 ASSESSMENT — PAIN DESCRIPTION - ORIENTATION: ORIENTATION: RIGHT;ANTERIOR;INNER

## 2023-10-24 NOTE — PERIOP NOTE
PLEASE CONTINUE TAKING ALL PRESCRIPTION MEDICATIONS UP TO THE DAY OF SURGERY UNLESS OTHERWISE DIRECTED BELOW. DISCONTINUE all vitamins, herbals and supplements 3 weeks prior to surgery. DISCONTINUE Non-Steroidal Anti-Inflammatory (NSAIDS) such as Advil, Ibuprofen, Motrin, Naproxen and Aleve 5 days prior to surgery. Home Medications to take  the day of surgery    Omeprazole, Metoprolol, Aspirin 81 mg           Home Medications   to Hold   Hold Plavix for 7 days before surgery. Keep taking Aspirin 81 mg daily while holding Aspirin. Comments   On the day before surgery please take Acetaminophen 2 tablets in the morning and then 2 tablets before bed. Bring Dynahex wash, Incentive Spirometer, omeprazole with you to hospital on the day of surgery. Please do not bring home medications with you on the day of surgery unless otherwise directed by your nurse. If you are instructed to bring home medications, please give them to your nurse as they will be administered by the nursing staff. If you have any questions, please call [address] [place] [address] [phone]. A copy of this note was provided to the patient for reference.

## 2023-10-24 NOTE — PERIOP NOTE
Lab results within anesthesia guidelines, no follow-up required. Labs automatically routed to ordering provider via Epic documentation.       MRSA swab still processing     Latest Reference Range & Units 10/24/23 08:39   Sodium 133 - 143 mmol/L 142   Potassium 3.5 - 5.1 mmol/L 3.8   Chloride 101 - 110 mmol/L 105   CO2 21 - 32 mmol/L 28   BUN,BUNPL 8 - 23 MG/DL 16   Creatinine 0.8 - 1.5 MG/DL 1.06   Anion Gap 2 - 11 mmol/L 9   Est, Glom Filt Rate >60 ml/min/1.73m2 >60   Glucose, Random 65 - 100 mg/dL 112 (H)   CALCIUM, SERUM, 141440 8.3 - 10.4 MG/DL 9.0   WBC 4.3 - 11.1 K/uL 6.6   RBC 4.23 - 5.6 M/uL 4.85   Hemoglobin Quant 13.6 - 17.2 g/dL 14.2   Hematocrit 41.1 - 50.3 % 42.1   MCV 82.0 - 102.0 FL 86.8   MCH 26.1 - 32.9 PG 29.3   MCHC 31.4 - 35.0 g/dL 33.7   MPV 9.4 - 12.3 FL 8.8 (L)   RDW 11.9 - 14.6 % 13.3   Platelet Count 143 - 450 K/uL 312   Nucleated Red Blood Cells 0.0 - 0.2 K/uL 0.00   MRSA/STAPH AUREUS DNA  Rpt   (H): Data is abnormally high  (L): Data is abnormally low  Rpt: View report in Results Review for more information

## 2023-10-24 NOTE — PERIOP NOTE
Patient verified name and . Order for consent IS NOT found in EHR ; patient verified. Type 3 surgery, joint camp assessment complete. Labs per surgeon: MRSA swab ; results processing. No further orders in EHR at time of assessment. Labs per anesthesia protocol: CBC , BMP; results processing. EKG:  dated 23 in EHR is within anesthesia protocols. Cardiology note dated 10/19/23 in EHR states:  \"Low to moderate risk  Had stenting to LCX May 9th. Nearly 6mo s/p PCI and asymptomatic. Would be best if could wait till mid to late November given guideline recs to wait 6 mos post LEORA PCI, but most likely OK to proceed on 10/27 if he wishes. OK to hold plavix 5-7 days but continue ASA thru procedure without interruption. Resume plavix post op ASAP, defer to ortho MD\"      MRSA/MSSA swab collected; pharmacy to review and dose antibiotic as appropriate. Hospital approved surgical skin cleanser and instructions to return bottle on DOS given per hospital policy. Patient provided with handouts including Guide to Surgery, Pain Management, Hand Hygiene, Blood Transfusion Education, and [facility] Brochure. Patient answered medical/surgical history questions at their best of ability. All prior to admission medications documented in [facility]. Original medication prescription bottles were visualized during patient appointment. Patient instructed to hold all vitamins 3 weeks prior to surgery and NSAIDS 5 days prior to surgery. Patient teach back successful and patient demonstrates knowledge of instruction.

## 2023-10-24 NOTE — PROGRESS NOTES
10/24/23 0820   Treatment   Treatment Type Bedside spirometry   Oxygen Therapy/Pulse Ox   O2 Therapy Room air   Pulse 67   SpO2 98 %   Pulse Oximeter Device Mode Intermittent   $Pulse Oximeter $Spot check (single)   Bedside Spirometry   FEV-1/Actual (Liters) 2.93 Liters   FEV-1/Predicted (Liters) 89 Liters     Initial respiratory Assessment completed with pt. Pt was interviewed and evaluated in Joint camp prior to surgery. Patient ID:  Ruth Miguel  [id number]  59 y.o.  1965  Surgeon: Dr. Media Angelucci  Date of Surgery: Tito@[employer]  Procedure: Total Right Knee Arthroplasty  Primary Care Physician: Jj Crocker [phone]  Specialists:     BS:CLEAR      Pt taught proper COUGH technique  IS REVIEWED WITH PT AS WELL AS BENEFITS OF USING IS IN SEDENTARY PTS. DIAPHRAGMATIC BREATHING EXERCISE INSTRUCTIONS GIVEN    History of smoking:   FORMER                 Quit date:         Secondhand smoke:PARENTS    Past procedures with Oxygen desaturation or delayed awakening:DENIES    Past Medical History:   Diagnosis Date    CAD (coronary artery disease)     GERD (gastroesophageal reflux disease)     managed with omeprazole and famotidine    HX OF COVID    Respiratory history:DENIES SOB                                                                  Respiratory meds:  DENIES    FAMILY PRESENT:             NO     PAST SLEEP STUDY:                           DENIES- DR LAWRENCE WANTED TO DO SLEEP STUDY ON PT BUT PT DECLINED  HX OF OMAR:                                        DENIES  OMAR assessment:     DANGERS OF UNTREATED OMAR EXPLAINED TO PT.                                          SLEEPS ON     STOMACH  PHYSICAL EXAM   There is no height or weight on file to calculate BMI.    Vitals:    10/24/23 0820   Pulse: (P) 67   SpO2: (P) 98%     Neck circumference:  43.5    cm    Loud snoring:                                                 YES            Witnessed apnea or wakening gasping or
29.3 26.1 - 32.9 PG    MCHC 33.7 31.4 - 35.0 g/dL    RDW 13.3 11.9 - 14.6 %    Platelets 664 669 - 530 K/uL    MPV 8.8 (L) 9.4 - 12.3 FL    nRBC 0.00 0.0 - 0.2 K/uL   Basic Metabolic Panel    Collection Time: 10/24/23  8:39 AM   Result Value Ref Range    Sodium 142 133 - 143 mmol/L    Potassium 3.8 3.5 - 5.1 mmol/L    Chloride 105 101 - 110 mmol/L    CO2 28 21 - 32 mmol/L    Anion Gap 9 2 - 11 mmol/L    Glucose 112 (H) 65 - 100 mg/dL    BUN 16 8 - 23 MG/DL    Creatinine 1.06 0.8 - 1.5 MG/DL    Est, Glom Filt Rate >60 >60 ml/min/1.73m2    Calcium 9.0 8.3 - 10.4 MG/DL         Problem List:  )  Patient Active Problem List   Diagnosis    History of coronary artery bypass graft x 3    CAD (coronary artery disease)    Dyslipidemia    Atypical chest pain    Primary osteoarthritis of right knee    Chronic insomnia    Seasonal allergies    Acute right ankle pain    Osteoarthritis of right knee       Total Joint Surgery Pre-Assessment Recommendations:           Patient would like to participate in SDD. Continuous saturation monitoring during hospitalization. O2 prn per respiratory protocol.      Signed By: SALLY Torrez - CNP-C    October 24, 2023

## 2023-10-24 NOTE — PROGRESS NOTES
Gaby Frederickjulia  : 1965  Primary: Nikky Wylie Ioana Sc  Secondary:  [facility] at [facility] AT [address], [facility] Johan   [phone]      Physical Therapy Prehab Evaluation Summary:10/24/2023   Time In/Out   PT Charge Capture  Episode     MEDICAL/REFERRING DIAGNOSIS: Unilateral primary osteoarthritis, right knee [M17.11]  REFERRING PHYSICIAN: Phuong Boone MD    Treatment Diagnosis:   Pain in Right Knee (M25.561)  Stiffness of Right Knee, Not elsewhere classified (M25.661)    DATE OF SURGERY: 10/27/23  Assessment:   COMMENTS:  Mr. Adelso Luna is present for a Prehab Physical Therapy Assessment for their upcoming right TKA. They are here alone. After discussing the surgical admission options and discharge plans, they are planning on discharging on day of surgery. He is a pt of Dr. Media Angelucci and Sri Diaz will perform his surgery. He will need DME prior to CT. He will have the support of his wife at CT. PROBLEM LIST:   (Impacting functional limitations):  Mr. Adelso Luna presents with the following lower extremity(s) problems:  Strength  Range of Motion  Home Exercise Program  Pain INTERVENTIONS PLANNED:   (Benefits and precautions of physical therapy have been discussed with the patient.)  Home Exercise Program  Educational Discussion       GOALS: (Goals have been discussed and agreed upon with patient.)  Discharge Goals: Time Frame: 1 Day  Patient will demonstrate independence with a home exercise program designed to increase strength, range of motion, and pain control to minimize functional deficits and optimize patient for total joint replacement. Subjective:   Past Medical History/Comorbidities:   Mr. Adelso Luna  has no past medical history on file. Mr. Adelso Luna  has a past surgical history that includes Colonoscopy w/ biopsies (N/A) and Coronary angioplasty with stent (N/A). Allergies:  Patient has no known allergies.     Current Medications:

## 2023-10-25 ENCOUNTER — TELEPHONE (OUTPATIENT)
Dept: SURGERY | Age: 58
End: 2023-10-25

## 2023-10-25 DIAGNOSIS — F51.04 CHRONIC INSOMNIA: ICD-10-CM

## 2023-10-25 DIAGNOSIS — M17.11 PRIMARY OSTEOARTHRITIS OF RIGHT KNEE: Primary | ICD-10-CM

## 2023-10-25 RX ORDER — OXYCODONE HYDROCHLORIDE 5 MG/1
5-10 TABLET ORAL EVERY 4 HOURS PRN
Qty: 60 TABLET | Refills: 0 | Status: SHIPPED | OUTPATIENT
Start: 2023-10-25 | End: 2023-11-01

## 2023-10-25 RX ORDER — METHOCARBAMOL 750 MG/1
TABLET, FILM COATED ORAL
Qty: 40 TABLET | Refills: 0 | Status: SHIPPED | OUTPATIENT
Start: 2023-10-25

## 2023-10-25 RX ORDER — ONDANSETRON 4 MG/1
4 TABLET, FILM COATED ORAL EVERY 6 HOURS PRN
Qty: 30 TABLET | Refills: 0 | Status: SHIPPED | OUTPATIENT
Start: 2023-10-25

## 2023-10-25 RX ORDER — ASPIRIN 81 MG/1
81 TABLET ORAL 2 TIMES DAILY
Qty: 60 TABLET | Refills: 0 | Status: ON HOLD | OUTPATIENT
Start: 2023-10-25 | End: 2023-10-27 | Stop reason: HOSPADM

## 2023-10-25 RX ORDER — TRAZODONE HYDROCHLORIDE 50 MG/1
50 TABLET ORAL NIGHTLY
Qty: 90 TABLET | Refills: 0 | Status: SHIPPED | OUTPATIENT
Start: 2023-10-25

## 2023-10-25 NOTE — PROGRESS NOTES
Dr. Sharmin Galaviz reviewed chart. No order received. States \"per cardiology clearance note, it would be best to wait until mid November based on the guidelines with regards to holding his Plavix. As long as Candelario Andrade is aware and the patient is willing to accept risks of stopping Plavix early, he can proceed with surgery 10/27. Surgeon's office notified.

## 2023-10-25 NOTE — TELEPHONE ENCOUNTER
Dr. Evan Martins reviewed chart. No order received. States \"per cardiology clearance note, it would be best to wait until mid November based on the guidelines with regards to holding his Plavix. As long as Parker Tao is aware and the patient is willing to accept risks of stopping Plavix early, he can proceed with surgery 10/27.

## 2023-10-25 NOTE — TELEPHONE ENCOUNTER
Last visit 8-23-23  Next visit 12-18-23    Pt requesting #90    Per Jimenez Almazan at [employer] pt has qty 30 available for pickup today but will need refills moving forward.

## 2023-10-26 ENCOUNTER — PREP FOR PROCEDURE (OUTPATIENT)
Dept: ORTHOPEDIC SURGERY | Age: 58
End: 2023-10-26

## 2023-10-26 ENCOUNTER — ANESTHESIA EVENT (OUTPATIENT)
Dept: SURGERY | Age: 58
End: 2023-10-26
Payer: COMMERCIAL

## 2023-10-26 DIAGNOSIS — M17.11 PRIMARY OSTEOARTHRITIS OF RIGHT KNEE: Primary | ICD-10-CM

## 2023-10-26 RX ORDER — ACETAMINOPHEN 500 MG
1000 TABLET ORAL ONCE
Status: CANCELLED | OUTPATIENT
Start: 2023-10-26 | End: 2023-10-26

## 2023-10-26 NOTE — H&P
H&P    Patient ID:  Faby Reardon  [id number]  59 y.o.  1965  Surgeon:  Lisa Mesa MD  Date of Surgery: * No surgery date entered *  Procedure: Robot assisted right Total Knee Arthroplasty  Primary Care Physician: Carrington Sierra MD        Subjective:  Faby Reardon is a 59 y.o. White (non-) male who presents with right knee pain. They have a history of right knee pain for several months. Symptoms worse with walking long distances and relieved with rest. Conservative treatment consisting of  activity modification and injections have not helped. The patient lives with their family. The patients goal after surgery is improved pain and function. Past Medical History:   Diagnosis Date    Atypical chest pain     CAD (coronary artery disease)     Chronic insomnia     Dyslipidemia     GERD (gastroesophageal reflux disease)     managed with omeprazole and famotidine    Osteoarthritis     right knee    Seasonal allergies     Snores       Past Surgical History:   Procedure Laterality Date    COLONOSCOPY W/ BIOPSIES N/A     2 yrs ago- unsure of recall    CORONARY ANGIOPLASTY WITH STENT PLACEMENT N/A     3, last 1 month ago. CORONARY ARTERY BYPASS GRAFT  08/2021    [facility]    HAND SURGERY Left     HIP SURGERY Left     eosinophilic granuloma    KNEE ARTHROSCOPY Right      Family History   Problem Relation Age of Onset    Heart Disease Mother     Heart Disease Father     Heart Disease Brother       Social History     Tobacco Use    Smoking status: Never    Smokeless tobacco: Never   Substance Use Topics    Alcohol use: Yes     Alcohol/week: 6.0 standard drinks of alcohol     Types: 2 Glasses of wine, 2 Cans of beer, 2 Shots of liquor per week     Comment: Social at most       Prior to Admission medications    Medication Sig Start Date End Date Taking?  Authorizing Provider   oxyCODONE (ROXICODONE) 5 MG immediate release tablet Take 1-2 tablets by mouth every 4 hours as needed for Pain for up to 7 days. Max Daily Amount: 60 mg 10/25/23 11/1/23  Dayna Pena MD   methocarbamol (ROBAXIN-750) 750 MG tablet Take 1 tablet by mouth every 6 hours as needed for spasms. 10/25/23   Dayna Pena MD   ondansetron (ZOFRAN) 4 MG tablet Take 1 tablet by mouth every 6 hours as needed for Nausea or Vomiting 10/25/23   Dayna Pena MD   aspirin 81 MG EC tablet Take 1 tablet by mouth 2 times daily 10/25/23   Dayna Pena MD   traZODone (DESYREL) 50 MG tablet Take 1 tablet by mouth nightly 10/25/23   Louie Maria MD   colchicine (COLCRYS) 0.6 MG tablet Take 2 tabs now, followed by 1 tab 1 hr later, then stop. 8/29/23   Louie Maria MD   indomethacin (INDOCIN) 50 MG capsule Take 1 capsule by mouth 2 times daily as needed for Pain (heel pain) 8/29/23   Louie Maria MD   Coenzyme Q10 (COQ10 PO) Take by mouth    Geneva To MD   cetirizine (ZYRTEC) 10 MG tablet Take 1 tablet by mouth daily  Patient not taking: Reported on 10/24/2023 8/14/23   Louie Maria MD   metoprolol succinate (TOPROL XL) 50 MG extended release tablet Take 1 tablet by mouth daily 6/5/23   Geneva To MD   rosuvastatin (CRESTOR) 40 MG tablet Take 1 tablet by mouth at bedtime 4/26/23   Geneva To MD   omeprazole (PRILOSEC) 40 MG delayed release capsule  5/23/23   Geneva To MD   nitroGLYCERIN (NITROSTAT) 0.4 MG SL tablet PLACE ONE TABLET UNDER THE TONGUE AT ONSET OF CHEST PAIN.  MAY REPEAT EVERY 5 MINUTES AS NEEDED FOR CHEST PAIN UP TO 3 TABLETS IN 15 MINUTES. 4/26/23   Geneva To MD   clopidogrel (PLAVIX) 75 MG tablet Take 1 tablet by mouth daily 5/23/23   Geneva To MD   aspirin 81 MG chewable tablet Take 1 tablet by mouth daily 1/17/12   Geneva To MD   famotidine (PEPCID) 40 MG tablet Take 1 tablet by mouth nightly 6/26/23   Rufina Hoffmann MD     No Known

## 2023-10-27 ENCOUNTER — ANESTHESIA (OUTPATIENT)
Dept: SURGERY | Age: 58
End: 2023-10-27
Payer: COMMERCIAL

## 2023-10-27 ENCOUNTER — HOME HEALTH ADMISSION (OUTPATIENT)
Dept: HOME HEALTH SERVICES | Facility: HOME HEALTH | Age: 58
End: 2023-10-27
Payer: COMMERCIAL

## 2023-10-27 ENCOUNTER — HOSPITAL ENCOUNTER (OUTPATIENT)
Age: 58
Discharge: HOME | End: 2023-10-27
Attending: ORTHOPAEDIC SURGERY | Admitting: ORTHOPAEDIC SURGERY
Payer: COMMERCIAL

## 2023-10-27 VITALS
OXYGEN SATURATION: 97 % | BODY MASS INDEX: 28.05 KG/M2 | TEMPERATURE: 98.3 F | HEIGHT: 69 IN | WEIGHT: 189.4 LBS | SYSTOLIC BLOOD PRESSURE: 124 MMHG | HEART RATE: 78 BPM | RESPIRATION RATE: 16 BRPM | DIASTOLIC BLOOD PRESSURE: 70 MMHG

## 2023-10-27 PROBLEM — M17.11 ARTHRITIS OF RIGHT KNEE: Status: ACTIVE | Noted: 2023-10-27

## 2023-10-27 PROCEDURE — 6360000002 HC RX W HCPCS: Performed by: PHYSICIAN ASSISTANT

## 2023-10-27 PROCEDURE — 6360000002 HC RX W HCPCS: Performed by: NURSE ANESTHETIST, CERTIFIED REGISTERED

## 2023-10-27 PROCEDURE — 2500000003 HC RX 250 WO HCPCS: Performed by: NURSE ANESTHETIST, CERTIFIED REGISTERED

## 2023-10-27 PROCEDURE — 6370000000 HC RX 637 (ALT 250 FOR IP): Performed by: PHYSICIAN ASSISTANT

## 2023-10-27 PROCEDURE — 27447 TOTAL KNEE ARTHROPLASTY: CPT | Performed by: ORTHOPAEDIC SURGERY

## 2023-10-27 PROCEDURE — C1776 JOINT DEVICE (IMPLANTABLE): HCPCS | Performed by: ORTHOPAEDIC SURGERY

## 2023-10-27 PROCEDURE — 6370000000 HC RX 637 (ALT 250 FOR IP): Performed by: ANESTHESIOLOGY

## 2023-10-27 PROCEDURE — 20985 CPTR-ASST DIR MS PX: CPT | Performed by: ORTHOPAEDIC SURGERY

## 2023-10-27 PROCEDURE — 27447 TOTAL KNEE ARTHROPLASTY: CPT | Performed by: PHYSICIAN ASSISTANT

## 2023-10-27 PROCEDURE — 97161 PT EVAL LOW COMPLEX 20 MIN: CPT

## 2023-10-27 PROCEDURE — 6360000002 HC RX W HCPCS: Performed by: ORTHOPAEDIC SURGERY

## 2023-10-27 PROCEDURE — 7100000001 HC PACU RECOVERY - ADDTL 15 MIN: Performed by: ORTHOPAEDIC SURGERY

## 2023-10-27 PROCEDURE — 2720000010 HC SURG SUPPLY STERILE: Performed by: ORTHOPAEDIC SURGERY

## 2023-10-27 PROCEDURE — 6360000002 HC RX W HCPCS: Performed by: ANESTHESIOLOGY

## 2023-10-27 PROCEDURE — C1713 ANCHOR/SCREW BN/BN,TIS/BN: HCPCS | Performed by: ORTHOPAEDIC SURGERY

## 2023-10-27 PROCEDURE — 3600000005 HC SURGERY LEVEL 5 BASE: Performed by: ORTHOPAEDIC SURGERY

## 2023-10-27 PROCEDURE — 97530 THERAPEUTIC ACTIVITIES: CPT

## 2023-10-27 PROCEDURE — 2709999900 HC NON-CHARGEABLE SUPPLY: Performed by: ORTHOPAEDIC SURGERY

## 2023-10-27 PROCEDURE — 3700000000 HC ANESTHESIA ATTENDED CARE: Performed by: ORTHOPAEDIC SURGERY

## 2023-10-27 PROCEDURE — 2580000003 HC RX 258: Performed by: ANESTHESIOLOGY

## 2023-10-27 PROCEDURE — 97165 OT EVAL LOW COMPLEX 30 MIN: CPT

## 2023-10-27 PROCEDURE — 2580000003 HC RX 258: Performed by: ORTHOPAEDIC SURGERY

## 2023-10-27 PROCEDURE — 97535 SELF CARE MNGMENT TRAINING: CPT

## 2023-10-27 PROCEDURE — 3700000001 HC ADD 15 MINUTES (ANESTHESIA): Performed by: ORTHOPAEDIC SURGERY

## 2023-10-27 PROCEDURE — 7100000000 HC PACU RECOVERY - FIRST 15 MIN: Performed by: ORTHOPAEDIC SURGERY

## 2023-10-27 PROCEDURE — 2500000003 HC RX 250 WO HCPCS: Performed by: ORTHOPAEDIC SURGERY

## 2023-10-27 PROCEDURE — 3600000015 HC SURGERY LEVEL 5 ADDTL 15MIN: Performed by: ORTHOPAEDIC SURGERY

## 2023-10-27 PROCEDURE — 64447 NJX AA&/STRD FEMORAL NRV IMG: CPT | Performed by: ANESTHESIOLOGY

## 2023-10-27 DEVICE — COMPONENT TOT KNEE CAPPED PRIMARY K2STRYKER] STRYKER CORP]: Type: IMPLANTABLE DEVICE | Status: FUNCTIONAL

## 2023-10-27 DEVICE — CRUCIATE RETAINING FEMORAL
Type: IMPLANTABLE DEVICE | Site: KNEE | Status: FUNCTIONAL
Brand: TRIATHLON

## 2023-10-27 DEVICE — INSERT TIB CS 6 10 MM ARTC POST KNEE BEAR TECHNOLOGY X3: Type: IMPLANTABLE DEVICE | Site: KNEE | Status: FUNCTIONAL

## 2023-10-27 DEVICE — DEPUY CMW 2 FAST SET BONE CEMENT 20G: Type: IMPLANTABLE DEVICE | Site: KNEE | Status: FUNCTIONAL

## 2023-10-27 DEVICE — ASYMMETRIC PATELLA
Type: IMPLANTABLE DEVICE | Site: KNEE | Status: FUNCTIONAL
Brand: TRIATHLON

## 2023-10-27 DEVICE — TIBIAL COMPONENT
Type: IMPLANTABLE DEVICE | Site: KNEE | Status: FUNCTIONAL
Brand: TRIATHLON

## 2023-10-27 RX ORDER — ONDANSETRON 2 MG/ML
INJECTION INTRAMUSCULAR; INTRAVENOUS PRN
Status: DISCONTINUED | OUTPATIENT
Start: 2023-10-27 | End: 2023-10-27 | Stop reason: SDUPTHER

## 2023-10-27 RX ORDER — PROPOFOL 10 MG/ML
INJECTION, EMULSION INTRAVENOUS CONTINUOUS PRN
Status: DISCONTINUED | OUTPATIENT
Start: 2023-10-27 | End: 2023-10-27 | Stop reason: SDUPTHER

## 2023-10-27 RX ORDER — MIDAZOLAM HYDROCHLORIDE 2 MG/2ML
2 INJECTION, SOLUTION INTRAMUSCULAR; INTRAVENOUS
Status: COMPLETED | OUTPATIENT
Start: 2023-10-27 | End: 2023-10-27

## 2023-10-27 RX ORDER — ACETAMINOPHEN 325 MG/1
650 TABLET ORAL EVERY 6 HOURS
Status: DISCONTINUED | OUTPATIENT
Start: 2023-10-27 | End: 2023-10-27 | Stop reason: HOSPADM

## 2023-10-27 RX ORDER — DIPHENHYDRAMINE HYDROCHLORIDE 50 MG/ML
12.5 INJECTION INTRAMUSCULAR; INTRAVENOUS
Status: DISCONTINUED | OUTPATIENT
Start: 2023-10-27 | End: 2023-10-27 | Stop reason: HOSPADM

## 2023-10-27 RX ORDER — ACETAMINOPHEN 325 MG/1
650 TABLET ORAL EVERY 6 HOURS
Status: DISCONTINUED | OUTPATIENT
Start: 2023-10-27 | End: 2023-10-27

## 2023-10-27 RX ORDER — SODIUM CHLORIDE 9 MG/ML
INJECTION, SOLUTION INTRAVENOUS PRN
Status: DISCONTINUED | OUTPATIENT
Start: 2023-10-27 | End: 2023-10-27 | Stop reason: HOSPADM

## 2023-10-27 RX ORDER — SODIUM CHLORIDE, SODIUM LACTATE, POTASSIUM CHLORIDE, CALCIUM CHLORIDE 600; 310; 30; 20 MG/100ML; MG/100ML; MG/100ML; MG/100ML
INJECTION, SOLUTION INTRAVENOUS CONTINUOUS
Status: DISCONTINUED | OUTPATIENT
Start: 2023-10-27 | End: 2023-10-27 | Stop reason: HOSPADM

## 2023-10-27 RX ORDER — KETOROLAC TROMETHAMINE 30 MG/ML
INJECTION, SOLUTION INTRAMUSCULAR; INTRAVENOUS PRN
Status: DISCONTINUED | OUTPATIENT
Start: 2023-10-27 | End: 2023-10-27 | Stop reason: HOSPADM

## 2023-10-27 RX ORDER — DEXAMETHASONE SODIUM PHOSPHATE 10 MG/ML
10 INJECTION INTRAMUSCULAR; INTRAVENOUS EVERY 6 HOURS
Status: COMPLETED | OUTPATIENT
Start: 2023-10-27 | End: 2023-10-27

## 2023-10-27 RX ORDER — METOPROLOL SUCCINATE 25 MG/1
50 TABLET, EXTENDED RELEASE ORAL DAILY
Status: DISCONTINUED | OUTPATIENT
Start: 2023-10-28 | End: 2023-10-27 | Stop reason: HOSPADM

## 2023-10-27 RX ORDER — SODIUM CHLORIDE 0.9 % (FLUSH) 0.9 %
5-40 SYRINGE (ML) INJECTION PRN
Status: DISCONTINUED | OUTPATIENT
Start: 2023-10-27 | End: 2023-10-27 | Stop reason: HOSPADM

## 2023-10-27 RX ORDER — METHOCARBAMOL 750 MG/1
750 TABLET, FILM COATED ORAL 4 TIMES DAILY PRN
Status: DISCONTINUED | OUTPATIENT
Start: 2023-10-27 | End: 2023-10-27 | Stop reason: HOSPADM

## 2023-10-27 RX ORDER — ROPIVACAINE HYDROCHLORIDE 2 MG/ML
INJECTION, SOLUTION EPIDURAL; INFILTRATION; PERINEURAL PRN
Status: DISCONTINUED | OUTPATIENT
Start: 2023-10-27 | End: 2023-10-27 | Stop reason: HOSPADM

## 2023-10-27 RX ORDER — ONDANSETRON 2 MG/ML
4 INJECTION INTRAMUSCULAR; INTRAVENOUS
Status: COMPLETED | OUTPATIENT
Start: 2023-10-27 | End: 2023-10-27

## 2023-10-27 RX ORDER — FAMOTIDINE 20 MG/1
40 TABLET, FILM COATED ORAL
Status: DISCONTINUED | OUTPATIENT
Start: 2023-10-27 | End: 2023-10-27 | Stop reason: HOSPADM

## 2023-10-27 RX ORDER — NALOXONE HYDROCHLORIDE 0.4 MG/ML
0.4 INJECTION, SOLUTION INTRAMUSCULAR; INTRAVENOUS; SUBCUTANEOUS PRN
Status: DISCONTINUED | OUTPATIENT
Start: 2023-10-27 | End: 2023-10-27 | Stop reason: HOSPADM

## 2023-10-27 RX ORDER — OXYCODONE HYDROCHLORIDE 5 MG/1
5 TABLET ORAL EVERY 4 HOURS PRN
Status: DISCONTINUED | OUTPATIENT
Start: 2023-10-27 | End: 2023-10-27 | Stop reason: HOSPADM

## 2023-10-27 RX ORDER — MAGNESIUM HYDROXIDE/ALUMINUM HYDROXICE/SIMETHICONE 120; 1200; 1200 MG/30ML; MG/30ML; MG/30ML
15 SUSPENSION ORAL EVERY 6 HOURS PRN
Status: DISCONTINUED | OUTPATIENT
Start: 2023-10-27 | End: 2023-10-27 | Stop reason: HOSPADM

## 2023-10-27 RX ORDER — PROCHLORPERAZINE EDISYLATE 5 MG/ML
5 INJECTION INTRAMUSCULAR; INTRAVENOUS
Status: DISCONTINUED | OUTPATIENT
Start: 2023-10-27 | End: 2023-10-27 | Stop reason: HOSPADM

## 2023-10-27 RX ORDER — ASPIRIN 81 MG/1
81 TABLET ORAL 2 TIMES DAILY
Status: DISCONTINUED | OUTPATIENT
Start: 2023-10-27 | End: 2023-10-27 | Stop reason: HOSPADM

## 2023-10-27 RX ORDER — TRANEXAMIC ACID 100 MG/ML
INJECTION, SOLUTION INTRAVENOUS PRN
Status: DISCONTINUED | OUTPATIENT
Start: 2023-10-27 | End: 2023-10-27 | Stop reason: SDUPTHER

## 2023-10-27 RX ORDER — OXYCODONE HYDROCHLORIDE 5 MG/1
5 TABLET ORAL
Status: COMPLETED | OUTPATIENT
Start: 2023-10-27 | End: 2023-10-27

## 2023-10-27 RX ORDER — DEXAMETHASONE SODIUM PHOSPHATE 10 MG/ML
INJECTION, SOLUTION INTRAMUSCULAR; INTRAVENOUS
Status: COMPLETED | OUTPATIENT
Start: 2023-10-27 | End: 2023-10-27

## 2023-10-27 RX ORDER — SODIUM CHLORIDE 0.9 % (FLUSH) 0.9 %
5-40 SYRINGE (ML) INJECTION EVERY 12 HOURS SCHEDULED
Status: DISCONTINUED | OUTPATIENT
Start: 2023-10-27 | End: 2023-10-27 | Stop reason: HOSPADM

## 2023-10-27 RX ORDER — DEXTROSE MONOHYDRATE 100 MG/ML
INJECTION, SOLUTION INTRAVENOUS CONTINUOUS PRN
Status: DISCONTINUED | OUTPATIENT
Start: 2023-10-27 | End: 2023-10-27 | Stop reason: HOSPADM

## 2023-10-27 RX ORDER — NITROGLYCERIN 0.4 MG/1
0.4 TABLET SUBLINGUAL EVERY 5 MIN PRN
Status: DISCONTINUED | OUTPATIENT
Start: 2023-10-27 | End: 2023-10-27 | Stop reason: HOSPADM

## 2023-10-27 RX ORDER — EPHEDRINE SULFATE/0.9% NACL/PF 50 MG/5 ML
SYRINGE (ML) INTRAVENOUS PRN
Status: DISCONTINUED | OUTPATIENT
Start: 2023-10-27 | End: 2023-10-27 | Stop reason: SDUPTHER

## 2023-10-27 RX ORDER — PANTOPRAZOLE SODIUM 40 MG/1
40 TABLET, DELAYED RELEASE ORAL
Status: DISCONTINUED | OUTPATIENT
Start: 2023-10-28 | End: 2023-10-27 | Stop reason: HOSPADM

## 2023-10-27 RX ORDER — ROSUVASTATIN CALCIUM 20 MG/1
40 TABLET, COATED ORAL NIGHTLY
Status: DISCONTINUED | OUTPATIENT
Start: 2023-10-27 | End: 2023-10-27 | Stop reason: HOSPADM

## 2023-10-27 RX ORDER — OXYCODONE HYDROCHLORIDE 5 MG/1
10 TABLET ORAL EVERY 4 HOURS PRN
Status: DISCONTINUED | OUTPATIENT
Start: 2023-10-27 | End: 2023-10-27 | Stop reason: HOSPADM

## 2023-10-27 RX ORDER — LIDOCAINE HYDROCHLORIDE 10 MG/ML
1 INJECTION, SOLUTION INFILTRATION; PERINEURAL
Status: DISCONTINUED | OUTPATIENT
Start: 2023-10-27 | End: 2023-10-27 | Stop reason: HOSPADM

## 2023-10-27 RX ORDER — TRAZODONE HYDROCHLORIDE 50 MG/1
50 TABLET ORAL NIGHTLY
Status: DISCONTINUED | OUTPATIENT
Start: 2023-10-27 | End: 2023-10-27 | Stop reason: HOSPADM

## 2023-10-27 RX ORDER — SODIUM CHLORIDE 9 MG/ML
INJECTION, SOLUTION INTRAVENOUS CONTINUOUS
Status: DISCONTINUED | OUTPATIENT
Start: 2023-10-27 | End: 2023-10-27 | Stop reason: HOSPADM

## 2023-10-27 RX ORDER — ALBUTEROL SULFATE 2.5 MG/3ML
2.5 SOLUTION RESPIRATORY (INHALATION) EVERY 6 HOURS PRN
Status: DISCONTINUED | OUTPATIENT
Start: 2023-10-27 | End: 2023-10-27 | Stop reason: HOSPADM

## 2023-10-27 RX ORDER — DIPHENHYDRAMINE HYDROCHLORIDE 50 MG/ML
25 INJECTION INTRAMUSCULAR; INTRAVENOUS EVERY 6 HOURS PRN
Status: DISCONTINUED | OUTPATIENT
Start: 2023-10-27 | End: 2023-10-27 | Stop reason: HOSPADM

## 2023-10-27 RX ORDER — FENTANYL CITRATE 50 UG/ML
100 INJECTION, SOLUTION INTRAMUSCULAR; INTRAVENOUS
Status: COMPLETED | OUTPATIENT
Start: 2023-10-27 | End: 2023-10-27

## 2023-10-27 RX ORDER — ACETAMINOPHEN 500 MG
1000 TABLET ORAL ONCE
Status: DISCONTINUED | OUTPATIENT
Start: 2023-10-27 | End: 2023-10-27 | Stop reason: HOSPADM

## 2023-10-27 RX ORDER — SENNA AND DOCUSATE SODIUM 50; 8.6 MG/1; MG/1
1 TABLET, FILM COATED ORAL 2 TIMES DAILY
Status: DISCONTINUED | OUTPATIENT
Start: 2023-10-27 | End: 2023-10-27 | Stop reason: HOSPADM

## 2023-10-27 RX ORDER — MIDAZOLAM HYDROCHLORIDE 1 MG/ML
INJECTION INTRAMUSCULAR; INTRAVENOUS PRN
Status: DISCONTINUED | OUTPATIENT
Start: 2023-10-27 | End: 2023-10-27 | Stop reason: SDUPTHER

## 2023-10-27 RX ORDER — ACETAMINOPHEN 500 MG
1000 TABLET ORAL ONCE
Status: DISCONTINUED | OUTPATIENT
Start: 2023-10-27 | End: 2023-10-27

## 2023-10-27 RX ORDER — DIPHENHYDRAMINE HCL 25 MG
25 CAPSULE ORAL EVERY 6 HOURS PRN
Status: DISCONTINUED | OUTPATIENT
Start: 2023-10-27 | End: 2023-10-27 | Stop reason: HOSPADM

## 2023-10-27 RX ORDER — PROMETHAZINE HYDROCHLORIDE 25 MG/1
25 TABLET ORAL EVERY 6 HOURS PRN
Status: DISCONTINUED | OUTPATIENT
Start: 2023-10-27 | End: 2023-10-27 | Stop reason: HOSPADM

## 2023-10-27 RX ORDER — ONDANSETRON 2 MG/ML
4 INJECTION INTRAMUSCULAR; INTRAVENOUS EVERY 6 HOURS PRN
Status: DISCONTINUED | OUTPATIENT
Start: 2023-10-27 | End: 2023-10-27 | Stop reason: HOSPADM

## 2023-10-27 RX ADMIN — MIDAZOLAM 2 MG: 1 INJECTION INTRAMUSCULAR; INTRAVENOUS at 10:15

## 2023-10-27 RX ADMIN — FENTANYL CITRATE 100 MCG: 50 INJECTION INTRAMUSCULAR; INTRAVENOUS at 10:15

## 2023-10-27 RX ADMIN — ONDANSETRON 4 MG: 2 INJECTION INTRAMUSCULAR; INTRAVENOUS at 10:35

## 2023-10-27 RX ADMIN — OXYCODONE HYDROCHLORIDE 5 MG: 5 TABLET ORAL at 12:25

## 2023-10-27 RX ADMIN — MIDAZOLAM 2 MG: 1 INJECTION INTRAMUSCULAR; INTRAVENOUS at 10:35

## 2023-10-27 RX ADMIN — PHENYLEPHRINE HYDROCHLORIDE 50 MCG: 0.1 INJECTION, SOLUTION INTRAVENOUS at 11:01

## 2023-10-27 RX ADMIN — PHENYLEPHRINE HYDROCHLORIDE 50 MCG: 0.1 INJECTION, SOLUTION INTRAVENOUS at 11:26

## 2023-10-27 RX ADMIN — Medication 2000 MG: at 10:42

## 2023-10-27 RX ADMIN — PHENYLEPHRINE HYDROCHLORIDE 50 MCG: 0.1 INJECTION, SOLUTION INTRAVENOUS at 11:19

## 2023-10-27 RX ADMIN — PHENYLEPHRINE HYDROCHLORIDE 50 MCG: 0.1 INJECTION, SOLUTION INTRAVENOUS at 11:08

## 2023-10-27 RX ADMIN — PROPOFOL 100 MCG/KG/MIN: 10 INJECTION, EMULSION INTRAVENOUS at 10:35

## 2023-10-27 RX ADMIN — SODIUM CHLORIDE, SODIUM LACTATE, POTASSIUM CHLORIDE, AND CALCIUM CHLORIDE: 600; 310; 30; 20 INJECTION, SOLUTION INTRAVENOUS at 11:26

## 2023-10-27 RX ADMIN — PHENYLEPHRINE HYDROCHLORIDE 50 MCG: 0.1 INJECTION, SOLUTION INTRAVENOUS at 11:40

## 2023-10-27 RX ADMIN — PHENYLEPHRINE HYDROCHLORIDE 50 MCG: 0.1 INJECTION, SOLUTION INTRAVENOUS at 11:32

## 2023-10-27 RX ADMIN — PHENYLEPHRINE HYDROCHLORIDE 50 MCG: 0.1 INJECTION, SOLUTION INTRAVENOUS at 10:52

## 2023-10-27 RX ADMIN — OXYCODONE HYDROCHLORIDE 10 MG: 5 TABLET ORAL at 15:43

## 2023-10-27 RX ADMIN — SODIUM CHLORIDE, SODIUM LACTATE, POTASSIUM CHLORIDE, AND CALCIUM CHLORIDE: 600; 310; 30; 20 INJECTION, SOLUTION INTRAVENOUS at 08:39

## 2023-10-27 RX ADMIN — ROPIVACAINE HYDROCHLORIDE 20 ML: 2 INJECTION, SOLUTION EPIDURAL; INFILTRATION at 10:15

## 2023-10-27 RX ADMIN — PHENYLEPHRINE HYDROCHLORIDE 50 MCG: 0.1 INJECTION, SOLUTION INTRAVENOUS at 11:53

## 2023-10-27 RX ADMIN — DEXAMETHASONE SODIUM PHOSPHATE 10 MG: 10 INJECTION, SOLUTION INTRAMUSCULAR; INTRAVENOUS at 10:35

## 2023-10-27 RX ADMIN — Medication 5 MG: at 10:52

## 2023-10-27 RX ADMIN — PHENYLEPHRINE HYDROCHLORIDE 50 MCG: 0.1 INJECTION, SOLUTION INTRAVENOUS at 11:43

## 2023-10-27 RX ADMIN — Medication 5 MG: at 11:26

## 2023-10-27 RX ADMIN — DEXAMETHASONE SODIUM PHOSPHATE 10 MG: 10 INJECTION INTRAMUSCULAR; INTRAVENOUS at 13:13

## 2023-10-27 RX ADMIN — MEPIVACAINE HYDROCHLORIDE 60 MG: 20 INJECTION, SOLUTION EPIDURAL; INFILTRATION at 10:31

## 2023-10-27 RX ADMIN — ONDANSETRON 4 MG: 2 INJECTION INTRAMUSCULAR; INTRAVENOUS at 12:25

## 2023-10-27 RX ADMIN — TRANEXAMIC ACID 1000 MG: 100 INJECTION, SOLUTION INTRAVENOUS at 10:35

## 2023-10-27 RX ADMIN — METHOCARBAMOL 750 MG: 750 TABLET ORAL at 13:13

## 2023-10-27 RX ADMIN — DEXAMETHASONE SODIUM PHOSPHATE 4 MG: 10 INJECTION, SOLUTION INTRAMUSCULAR; INTRAVENOUS at 10:15

## 2023-10-27 RX ADMIN — Medication 5 MG: at 11:08

## 2023-10-27 ASSESSMENT — PAIN DESCRIPTION - ORIENTATION: ORIENTATION: RIGHT

## 2023-10-27 ASSESSMENT — PAIN SCALES - GENERAL
PAINLEVEL_OUTOF10: 0
PAINLEVEL_OUTOF10: 6

## 2023-10-27 ASSESSMENT — PAIN DESCRIPTION - LOCATION: LOCATION: KNEE

## 2023-10-27 ASSESSMENT — PAIN DESCRIPTION - DESCRIPTORS: DESCRIPTORS: ACHING

## 2023-10-27 ASSESSMENT — PAIN - FUNCTIONAL ASSESSMENT: PAIN_FUNCTIONAL_ASSESSMENT: 0-10

## 2023-10-27 NOTE — ANESTHESIA PRE PROCEDURE
Department of Anesthesiology  Preprocedure Note       Name:  Lynsey Ramírez   Age:  59 y.o.  :  1965                                          MRN:  [id number]         Date:  10/27/2023      Surgeon: Odilia Don):  Oumar Kam MD    Procedure: Procedure(s):  KNEE TOTAL ARTHROPLASTY-Right-Srinivas- SDD    Medications prior to admission:   Prior to Admission medications    Medication Sig Start Date End Date Taking? Authorizing Provider   oxyCODONE (ROXICODONE) 5 MG immediate release tablet Take 1-2 tablets by mouth every 4 hours as needed for Pain for up to 7 days. Max Daily Amount: 60 mg  Patient not taking: Reported on 10/27/2023 10/25/23 11/1/23  Oumar Kam MD   methocarbamol (ROBAXIN-750) 750 MG tablet Take 1 tablet by mouth every 6 hours as needed for spasms. Patient not taking: Reported on 10/27/2023 10/25/23   Oumar Kam MD   ondansetron [facility]) 4 MG tablet Take 1 tablet by mouth every 6 hours as needed for Nausea or Vomiting  Patient not taking: Reported on 10/27/2023 10/25/23   Oumar Kam MD   aspirin 81 MG EC tablet Take 1 tablet by mouth 2 times daily  Patient not taking: Reported on 10/27/2023 10/25/23   Oumar Kam MD   traZODone (DESYREL) 50 MG tablet Take 1 tablet by mouth nightly 10/25/23   Jaison Maria MD   colchicine (COLCRYS) 0.6 MG tablet Take 2 tabs now, followed by 1 tab 1 hr later, then stop.  23   Jaison Maria MD   indomethacin (INDOCIN) 50 MG capsule Take 1 capsule by mouth 2 times daily as needed for Pain (heel pain)  Patient not taking: Reported on 10/27/2023 8/29/23   Jaison Maria MD   Coenzyme Q10 (COQ10 PO) Take by mouth    Provider, MD Geneva   cetirizine (ZYRTEC) 10 MG tablet Take 1 tablet by mouth daily  Patient not taking: Reported on 10/24/2023 8/14/23   Jaison Maria MD   metoprolol succinate (TOPROL XL) 50 MG extended release tablet Take 1 tablet by mouth daily 23

## 2023-10-27 NOTE — DISCHARGE SUMMARY
[facility]  Total Joint Discharge Summary      Patient ID:  Liberty Israel  [id number]  59 y.o.  1965    Admit date: 10/27/2023  Discharge date and time: 10/27/23   Admitting Physician: Cory Raymond MD  Surgeon: Same  Admission Diagnoses: Osteoarthritis of right knee [M17.11]  Arthritis of right knee [M17.11]  Discharge Diagnoses: Principal Problem:    Primary osteoarthritis of right knee  Active Problems:    Osteoarthritis of right knee    Arthritis of right knee  Resolved Problems:    * No resolved hospital problems. *                              Perioperative Antibiotics: Ancef 1 to 3 g was given depending on patient's weight.  If allergic to Ancef or due to other indications, patient was given Vancomycin/Gent per protocol      Hospital Medications given:   sodium chloride flush, 5-40 mL, 2 times per day  famotidine, 40 mg, QHS  metoprolol succinate, 50 mg, Daily  [START ON 10/28/2023] pantoprazole, 40 mg, QAM AC  rosuvastatin, 40 mg, Nightly  traZODone, 50 mg, Nightly  sodium chloride flush, 5-40 mL, 2 times per day  acetaminophen, 650 mg, Q6H  ceFAZolin (ANCEF) IVPB, 2,000 mg, Q8H  sennosides-docusate sodium, 1 tablet, BID  aspirin, 81 mg, BID  dexamethasone, 10 mg, Q6H      lactated ringers IV soln  sodium chloride  dextrose  sodium chloride  sodium chloride      sodium chloride flush, 5-40 mL, PRN  sodium chloride, , PRN  HYDROmorphone, 0.5 mg, Q5 Min PRN  oxyCODONE, 5 mg, Once PRN  ondansetron, 4 mg, Once PRN  prochlorperazine, 5 mg, Once PRN  diphenhydrAMINE, 12.5 mg, Once PRN  glucose, 4 tablet, PRN  dextrose bolus, 125 mL, PRN   Or  dextrose bolus, 250 mL, PRN  glucagon (rDNA), 1 mg, PRN  dextrose, , Continuous PRN  nitroGLYCERIN, 0.4 mg, Q5 Min PRN  sodium chloride flush, 5-40 mL, PRN  sodium chloride, , PRN  oxyCODONE, 5 mg, Q4H PRN   Or  oxyCODONE, 10 mg, Q4H PRN  HYDROmorphone, 0.5 mg, Q3H PRN   Or  HYDROmorphone, 1 mg, Q3H PRN  promethazine, 25 mg, Q6H PRN Or  ondansetron, 4 mg, Q6H PRN  aluminum & magnesium hydroxide-simethicone, 15 mL, Q6H PRN  diphenhydrAMINE, 25 mg, Q6H PRN   Or  diphenhydrAMINE, 25 mg, Q6H PRN  methocarbamol, 750 mg, 4x Daily PRN  naloxone, 0.4 mg, PRN        Discharge Medications given:  Current Discharge Medication List        CONTINUE these medications which have NOT CHANGED    Details   oxyCODONE (ROXICODONE) 5 MG immediate release tablet Take 1-2 tablets by mouth every 4 hours as needed for Pain for up to 7 days. Max Daily Amount: 60 mg  Qty: 60 tablet, Refills: 0    Comments: Reduce doses taken as pain becomes manageable  Associated Diagnoses: Primary osteoarthritis of right knee      methocarbamol (ROBAXIN-750) 750 MG tablet Take 1 tablet by mouth every 6 hours as needed for spasms.   Qty: 40 tablet, Refills: 0    Associated Diagnoses: Primary osteoarthritis of right knee      ondansetron (ZOFRAN) 4 MG tablet Take 1 tablet by mouth every 6 hours as needed for Nausea or Vomiting  Qty: 30 tablet, Refills: 0    Associated Diagnoses: Primary osteoarthritis of right knee      traZODone (DESYREL) 50 MG tablet Take 1 tablet by mouth nightly  Qty: 90 tablet, Refills: 0    Associated Diagnoses: Chronic insomnia      metoprolol succinate (TOPROL XL) 50 MG extended release tablet Take 1 tablet by mouth daily      rosuvastatin (CRESTOR) 40 MG tablet Take 1 tablet by mouth at bedtime      omeprazole (PRILOSEC) 40 MG delayed release capsule       clopidogrel (PLAVIX) 75 MG tablet Take 1 tablet by mouth daily      aspirin 81 MG chewable tablet Take 1 tablet by mouth daily      famotidine (PEPCID) 40 MG tablet Take 1 tablet by mouth nightly  Qty: 30 tablet, Refills: 11           STOP taking these medications       aspirin 81 MG EC tablet Comments:   Reason for Stopping:         colchicine (COLCRYS) 0.6 MG tablet Comments:   Reason for Stopping:         indomethacin (INDOCIN) 50 MG capsule Comments:   Reason for Stopping:         Coenzyme Q10 (COQ10 PO)

## 2023-10-27 NOTE — CARE COORDINATION
10/27/23 1457   Service Assessment   Patient Orientation Alert and Oriented;Person;Place;Situation;Self   Cognition Alert   History Provided By Patient   Primary Caregiver Self   Accompanied By/Relationship [address] spouse   Support Systems Spouse/Significant Other   PCP Verified by CM Yes   Prior Functional Level Independent in ADLs/IADLs   Current Functional Level Independent in ADLs/IADLs   Can patient return to prior living arrangement Yes   Ability to make needs known: Good   Family able to assist with home care needs: Yes   Would you like for me to discuss the discharge plan with any other family members/significant others, and if so, who? Yes   CM/SW Referral Other (see comment)  (d/c planning)   Social/Functional History   Lives With Spouse   Type of Home House   ADL Assistance Independent   Ambulation Assistance Independent   Transfer Assistance Independent   Mode of Transportation Car   Discharge Planning   Type of [address] Spouse/Significant Other   Current Services Prior To Admission [address]   Potential DME Needed Bedside Commode   DME Ordered? Bedside commode   Potential Assistance Purchasing Medications No   Type of Home Care Services None   Patient expects to be discharged to: [facility]   Transition of Care Consult (CM Consult) Home Health;Discharge Planning   Internal [address] Discharge None   Mode of Transport at Discharge Other (see comment)   Confirm Follow Up Transport Family   Condition of Participation: Discharge Planning   The Plan for Transition of Care is related to the following treatment goals: Home with spouse and [facility] home health   The Patient and/or Patient Representative was provided with a Choice of Provider? Patient   The Patient and/Or Patient Representative agree with the Discharge Plan?  Yes   Freedom of Choice list was provided with basic dialogue that supports the patient's individualized plan of care/goals, treatment preferences, and shares the quality data associated with the providers? Yes     CM met with patient for d/c planning Patient alert and oriented x 3, independent of ADL's and lives with spouse. He is S/P TKA. He has [employer] and request 3 in 1 bedside commode. He has no preference for DME company and agreed to referral to [address] [place] [address]. Patient provided with [facility] from [address] [place], [place] [address]tained orders for DME and referral sent to [address] [place] [address]. Patient chose [facility] home health and liaison is aware of the referral Patient to d/c home today with spouse and [facility] home health.  Patient in agreement with d/c

## 2023-10-27 NOTE — PERIOP NOTE
MD Mejia at bedside with patient. Pt VSS stable. Pain and Nausea controlled at this time. Verbal sign out per MD when pacu care is completed. Plan of care continues.

## 2023-10-27 NOTE — ANESTHESIA PROCEDURE NOTES
Spinal Block    Patient location during procedure: OR  End time: 10/27/2023 10:33 AM  Reason for block: primary anesthetic  Staffing  Performed: anesthesiologist   Anesthesiologist: Jami Cervantes MD  Performed by: Jami Cervantes MD  Authorized by: Jami Cervantes MD    Spinal Block  Patient position: sitting  Prep: ChloraPrep  Patient monitoring: cardiac monitor, continuous pulse ox, frequent blood pressure checks and oxygen  Approach: midline  Location: L3/L4  Provider prep: sterile gloves and mask  Local infiltration: lidocaine  Needle  Needle type:  Parul   Needle gauge: 25 G  Assessment  CSF: clear  Attempts: 1  Hemodynamics: stable  Preanesthetic Checklist  Completed: patient identified, IV checked, site marked, risks and benefits discussed, surgical/procedural consents, equipment checked, pre-op evaluation, timeout performed, anesthesia consent given, oxygen available and monitors applied/VS acknowledged

## 2023-10-27 NOTE — INTERVAL H&P NOTE
Update History & Physical    The patient's History and Physical of October 26, 2023 was reviewed with the patient and I examined the patient. There was no change. The surgical site was confirmed by the patient and me. Plan: The risks, benefits, expected outcome, and alternative to the recommended procedure have been discussed with the patient. Patient understands and wants to proceed with the procedure.      Electronically signed by Easton Newton MD on 10/27/2023 at 9:30 AM

## 2023-10-27 NOTE — ANESTHESIA POSTPROCEDURE EVALUATION
Department of Anesthesiology  Postprocedure Note    Patient: Regina Ramírez  MRN: [id number]  YOB: 1965  Date of evaluation: 10/27/2023      Procedure Summary     Date: 10/27/23 Room / Location: [facility] MAIN OR 08 / [facility] MAIN OR    Anesthesia Start: 1026 Anesthesia Stop: 6724    Procedure: KNEE TOTAL ARTHROPLASTY-Right-Srinivas- SDD (Right: Knee) Diagnosis:       Osteoarthritis of right knee      (Osteoarthritis of right knee [M17.11])    Surgeons: Sterling Crowley MD Responsible Provider: Gareth Heller MD    Anesthesia Type: Spinal ASA Status: 4          Anesthesia Type: Spinal    Juan Jose Phase I: Juan Jose Score: 9    Juan Jose Phase II:        Anesthesia Post Evaluation    Patient location during evaluation: PACU  Patient participation: complete - patient participated  Level of consciousness: awake and alert  Airway patency: patent  Nausea: well controlled. Complications: no  Cardiovascular status: acceptable.   Respiratory status: acceptable  Hydration status: stable  Pain management: adequate

## 2023-10-27 NOTE — PERIOP NOTE
TRANSFER - OUT REPORT:    Verbal report given to Schuyler Gaines RN on Christian Chiu  being transferred to [facility] for routine post-op       Report consisted of patient's Situation, Background, Assessment and   Recommendations(SBAR). Information from the following report(s) Nurse Handoff Report was reviewed with the receiving nurse. Lines:   Peripheral IV 10/27/23 Left;Posterior Hand (Active)   Site Assessment Clean, dry & intact 10/27/23 1220   Line Status Infusing 10/27/23 1220   Line Care Connections checked and tightened 10/27/23 1220   Phlebitis Assessment No symptoms 10/27/23 1220   Infiltration Assessment 0 10/27/23 1220   Alcohol Cap Used No 10/27/23 1220   Dressing Status Clean, dry & intact 10/27/23 1220   Dressing Type Transparent 10/27/23 1220        Opportunity for questions and clarification was provided.       Patient transported with:  O2 @ room air lpm

## 2023-10-27 NOTE — ANESTHESIA PROCEDURE NOTES
Peripheral Block    Patient location during procedure: pre-op  Reason for block: post-op pain management and at surgeon's request  Start time: 10/27/2023 10:15 AM  End time: 10/27/2023 10:17 AM  Staffing  Performed: anesthesiologist   Anesthesiologist: Madi Darden MD  Performed by: Madi Darden MD  Authorized by: Madi Darden MD    Preanesthetic Checklist  Completed: patient identified, IV checked, site marked, risks and benefits discussed, surgical/procedural consents, equipment checked, pre-op evaluation, timeout performed, anesthesia consent given, oxygen available and monitors applied/VS acknowledged  Peripheral Block   Patient position: supine  Prep: ChloraPrep  Provider prep: sterile gloves and mask  Patient monitoring: cardiac monitor, continuous pulse ox, frequent blood pressure checks, IV access, oxygen and responsive to questions  Block type: Femoral  Adductor canal  Laterality: right  Injection technique: single-shot  Guidance: ultrasound guided    Needle   Needle type: insulated echogenic nerve stimulator needle   Needle gauge: 20 G  Needle localization: ultrasound guidance  Needle length: 10 cm  Assessment   Injection assessment: negative aspiration for heme, no paresthesia on injection, local visualized surrounding nerve on ultrasound and no intravascular symptoms  Paresthesia pain: none  Slow fractionated injection: yes  Hemodynamics: stable  Real-time US image taken/store: yes  Outcomes: uncomplicated and patient tolerated procedure well    Additional Notes  Ultrasound image taken and stored in chart   Medications Administered  dexamethasone (DECADRON) (PF) 10 mg/mL injection - Other   4 mg - 10/27/2023 10:15:00 AM  ropivacaine 0.2% with epinephrine 1:200,000 injection (ANESTH) (Mixture components: EPINEPHrine PF 1 MG/ML Soln, 0.005 mL; ropivacaine 0.2% Soln, 0.1 mL) - Perineural   20 mL - 10/27/2023 10:15:00 AM

## 2023-10-27 NOTE — DISCHARGE INSTRUCTIONS
[facility]      Patient Discharge Instructions    Netty Closs / [id number] : 1965    Admitted 10/27/2023 Discharged: 10/27/2023     IF YOU HAVE ANY PROBLEMS ONCE YOU ARE AT HOME CALL THE FOLLOWING NUMBERS:   Main office number: [phone]      Medications    The medications you are to continue on are listed on the medication reconciliation sheet. Narcotic pain medications as well as supplemental iron can cause constipation. If this occurs try stopping the narcotic pain medication and/or the iron. It is important that you take the medication exactly as they are prescribed. Medications which increase your risk of blood clots are listed to stop for 5 weeks after surgery as well as medications or supplements which increase your risk of bleeding complications. Keep your medication in the bottles provided by the pharmacist and keep a list of the medication names, dosages, and times to be taken in your wallet. Do not take other medications without consulting your doctor. Important Information    Do NOT smoke as this will greatly increase your risk of infection! Resume your prehospital diet. If you have excessive nausea or vomitting call your doctor's office     Leg swelling and warmth is normal for 6 months after surgery. If you experience swelling in your leg elevate you leg while laying down with your toes above your heart. If you have sudden onset severe swelling with leg pain call our office. Use Surya Hose stockings until we see you in the office for your follow up appointment. The stitches deep inside take approximately 6 months to dissolve. There will be sharp shooting, stinging and burning pain. This is normal and will resolve between 3-6 months after surgery. Difficulty sleeping is normal following total Knee and Hip replacement. You may try melatonin, an over-the-counter sleep aid or benadryl to help with sleep.  Most patients will resume sleeping through

## 2023-10-28 ENCOUNTER — HOME CARE VISIT (OUTPATIENT)
Dept: SCHEDULING | Facility: HOME HEALTH | Age: 58
End: 2023-10-28
Payer: COMMERCIAL

## 2023-10-28 VITALS
RESPIRATION RATE: 16 BRPM | OXYGEN SATURATION: 97 % | SYSTOLIC BLOOD PRESSURE: 130 MMHG | DIASTOLIC BLOOD PRESSURE: 70 MMHG | TEMPERATURE: 98.1 F | HEART RATE: 60 BPM

## 2023-10-28 PROCEDURE — G0151 HHCP-SERV OF PT,EA 15 MIN: HCPCS

## 2023-10-28 ASSESSMENT — ENCOUNTER SYMPTOMS: HEMOPTYSIS: 0

## 2023-10-30 ENCOUNTER — TELEPHONE (OUTPATIENT)
Dept: ORTHOPEDICS UNIT | Age: 58
End: 2023-10-30

## 2023-10-30 ENCOUNTER — HOME CARE VISIT (OUTPATIENT)
Dept: SCHEDULING | Facility: HOME HEALTH | Age: 58
End: 2023-10-30
Payer: COMMERCIAL

## 2023-10-30 VITALS
TEMPERATURE: 97.4 F | SYSTOLIC BLOOD PRESSURE: 130 MMHG | RESPIRATION RATE: 19 BRPM | HEART RATE: 76 BPM | DIASTOLIC BLOOD PRESSURE: 66 MMHG

## 2023-10-30 DIAGNOSIS — Z96.651 S/P TOTAL KNEE ARTHROPLASTY, RIGHT: Primary | ICD-10-CM

## 2023-10-30 PROCEDURE — G0157 HHC PT ASSISTANT EA 15: HCPCS

## 2023-10-30 ASSESSMENT — ENCOUNTER SYMPTOMS: PAIN LOCATION - PAIN QUALITY: ACHE,SORE

## 2023-10-30 NOTE — TELEPHONE ENCOUNTER
Called to follow up after TKA with SDD on 10/27/23. Is doing \" ok. \"  Post op pain is managed. Bowels have moved. Advised how to alternate narcotic, ES tylenol and muscle relaxer. Is current with [facility]. Reviewed contact number for ortho navigator.

## 2023-11-01 ENCOUNTER — HOME CARE VISIT (OUTPATIENT)
Dept: SCHEDULING | Facility: HOME HEALTH | Age: 58
End: 2023-11-01
Payer: COMMERCIAL

## 2023-11-01 VITALS
DIASTOLIC BLOOD PRESSURE: 66 MMHG | HEART RATE: 75 BPM | TEMPERATURE: 97.3 F | RESPIRATION RATE: 19 BRPM | SYSTOLIC BLOOD PRESSURE: 126 MMHG

## 2023-11-01 DIAGNOSIS — M17.11 PRIMARY OSTEOARTHRITIS OF RIGHT KNEE: Primary | ICD-10-CM

## 2023-11-01 PROCEDURE — G0157 HHC PT ASSISTANT EA 15: HCPCS

## 2023-11-01 RX ORDER — OXYCODONE HYDROCHLORIDE 5 MG/1
5-10 TABLET ORAL EVERY 4 HOURS PRN
Qty: 60 TABLET | Refills: 0 | Status: SHIPPED | OUTPATIENT
Start: 2023-11-01 | End: 2023-11-08

## 2023-11-01 ASSESSMENT — ENCOUNTER SYMPTOMS: PAIN LOCATION - PAIN QUALITY: ACHE,SORE

## 2023-11-03 ENCOUNTER — HOME CARE VISIT (OUTPATIENT)
Dept: SCHEDULING | Facility: HOME HEALTH | Age: 58
End: 2023-11-03
Payer: COMMERCIAL

## 2023-11-03 VITALS
SYSTOLIC BLOOD PRESSURE: 132 MMHG | RESPIRATION RATE: 19 BRPM | HEART RATE: 75 BPM | DIASTOLIC BLOOD PRESSURE: 68 MMHG | TEMPERATURE: 97.4 F

## 2023-11-03 PROCEDURE — G0157 HHC PT ASSISTANT EA 15: HCPCS

## 2023-11-03 ASSESSMENT — ENCOUNTER SYMPTOMS: PAIN LOCATION - PAIN QUALITY: ACHE,SORE

## 2023-11-07 ENCOUNTER — HOME CARE VISIT (OUTPATIENT)
Dept: SCHEDULING | Facility: HOME HEALTH | Age: 58
End: 2023-11-07
Payer: COMMERCIAL

## 2023-11-07 VITALS
TEMPERATURE: 97.3 F | DIASTOLIC BLOOD PRESSURE: 68 MMHG | SYSTOLIC BLOOD PRESSURE: 124 MMHG | RESPIRATION RATE: 20 BRPM | HEART RATE: 75 BPM

## 2023-11-07 PROCEDURE — G0157 HHC PT ASSISTANT EA 15: HCPCS

## 2023-11-09 ENCOUNTER — HOME CARE VISIT (OUTPATIENT)
Dept: SCHEDULING | Facility: HOME HEALTH | Age: 58
End: 2023-11-09
Payer: COMMERCIAL

## 2023-11-09 VITALS
DIASTOLIC BLOOD PRESSURE: 68 MMHG | HEART RATE: 77 BPM | SYSTOLIC BLOOD PRESSURE: 136 MMHG | RESPIRATION RATE: 20 BRPM | TEMPERATURE: 97.4 F

## 2023-11-09 PROCEDURE — G0157 HHC PT ASSISTANT EA 15: HCPCS

## 2023-11-09 ASSESSMENT — ENCOUNTER SYMPTOMS: PAIN LOCATION - PAIN QUALITY: ACHE

## 2023-11-10 DIAGNOSIS — M17.11 PRIMARY OSTEOARTHRITIS OF RIGHT KNEE: Primary | ICD-10-CM

## 2023-11-10 RX ORDER — OXYCODONE HYDROCHLORIDE 5 MG/1
5-10 TABLET ORAL EVERY 4 HOURS PRN
Qty: 60 TABLET | Refills: 0 | Status: SHIPPED | OUTPATIENT
Start: 2023-11-10 | End: 2023-11-17

## 2023-11-13 ENCOUNTER — HOME CARE VISIT (OUTPATIENT)
Dept: SCHEDULING | Facility: HOME HEALTH | Age: 58
End: 2023-11-13
Payer: COMMERCIAL

## 2023-11-13 VITALS
DIASTOLIC BLOOD PRESSURE: 68 MMHG | SYSTOLIC BLOOD PRESSURE: 136 MMHG | HEART RATE: 75 BPM | RESPIRATION RATE: 20 BRPM | TEMPERATURE: 97.3 F

## 2023-11-13 PROCEDURE — G0157 HHC PT ASSISTANT EA 15: HCPCS

## 2023-11-13 ASSESSMENT — ENCOUNTER SYMPTOMS: PAIN LOCATION - PAIN QUALITY: ACHE

## 2023-11-16 ENCOUNTER — HOME CARE VISIT (OUTPATIENT)
Dept: SCHEDULING | Facility: HOME HEALTH | Age: 58
End: 2023-11-16
Payer: COMMERCIAL

## 2023-11-16 VITALS
RESPIRATION RATE: 16 BRPM | HEART RATE: 78 BPM | SYSTOLIC BLOOD PRESSURE: 138 MMHG | DIASTOLIC BLOOD PRESSURE: 80 MMHG | TEMPERATURE: 97.1 F | OXYGEN SATURATION: 98 %

## 2023-11-16 PROCEDURE — G0151 HHCP-SERV OF PT,EA 15 MIN: HCPCS

## 2023-11-16 ASSESSMENT — ENCOUNTER SYMPTOMS: PAIN LOCATION - PAIN QUALITY: ACHE

## 2023-11-21 ENCOUNTER — HOSPITAL ENCOUNTER (OUTPATIENT)
Dept: PHYSICAL THERAPY | Age: 58
Setting detail: RECURRING SERIES
Discharge: HOME | End: 2023-11-24
Payer: COMMERCIAL

## 2023-11-21 DIAGNOSIS — Z96.651 S/P TOTAL KNEE ARTHROPLASTY, RIGHT: ICD-10-CM

## 2023-11-21 DIAGNOSIS — M25.561 ACUTE PAIN OF RIGHT KNEE: Primary | ICD-10-CM

## 2023-11-21 DIAGNOSIS — M25.661 STIFFNESS OF RIGHT KNEE, NOT ELSEWHERE CLASSIFIED: ICD-10-CM

## 2023-11-21 PROCEDURE — 97161 PT EVAL LOW COMPLEX 20 MIN: CPT

## 2023-11-21 PROCEDURE — 97110 THERAPEUTIC EXERCISES: CPT

## 2023-11-21 ASSESSMENT — PAIN SCALES - GENERAL: PAINLEVEL_OUTOF10: 6

## 2023-11-21 NOTE — THERAPY EVALUATION
Andrey Knee Demopoulos  : 1965  Primary: Cheri  Sc ([facility])  Secondary:  [address] @ [address] [facility] [address]  Phone: [phone]  Fax: [phone] Plan Frequency: 2x/wk, 5 weeks (only able to do PT until the end of the year)    Plan of Care/Certification Expiration Date: 23      PT Visit Info:  Plan Frequency: 2x/wk, 5 weeks (only able to do PT until the end of the year)  Plan of Care/Certification Expiration Date: 23      Visit Count:  1                OUTPATIENT PHYSICAL THERAPY:             Initial Assessment 2023               Episode (R TKA) Appt Desk         Treatment Diagnosis:    S/P total knee arthroplasty, right  Acute pain of right knee  Stiffness of right knee, not elsewhere classified  Medical/Referring Diagnosis:      Referring Physician:  Reese Villa MD MD Orders:  PT Eval and Treat   Return MD Appt:  23  Date of Onset:  Onset Date: 10/27/23 (DOS)      Allergies:  Patient has no known allergies. Restrictions/Precautions:    Restrictions/Precautions: None      Medications Last Reviewed:  2023     SUBJECTIVE   History of Injury/Illness (Reason for Referral): Pt presents s/p R TKA performed by Dr. Dony Malik on 10/27/23 for treatment of functional deficits from knee OA. Since surgery, he's a rough time with pain though his [facility] PT told him he's doing really well. Not really using his cane much anymore. Pt wants to get back to going to the gym (weights and walking on the treadmill) and playing pickleball. States he can only do PT until the end of the year due to financial reasons.    Patient Stated Goal(s):  \"flexibility\"  Initial:     6/10 Post Session:     6/10  Past Medical History/Comorbidities:   Mr. Eliud Mcgill  has a past medical history of Atypical chest pain, CAD (coronary artery disease), Chronic insomnia, Dyslipidemia, GERD (gastroesophageal reflux disease), Osteoarthritis, Seasonal

## 2023-11-21 NOTE — PROGRESS NOTES
Maddy Ramírez  : 1965  Primary: Sincere Fleming ([facility])  Secondary:  [address] @ [address] [place] [address] CHRIS THORPE  [address] [facility] [address]  Phone: [phone]  Fax: [phone] Plan Frequency: 2x/wk, 5 weeks (only able to do PT until the end of the year)  Plan of Care/Certification Expiration Date: 23      >PT Visit Info:  Plan Frequency: 2x/wk, 5 weeks (only able to do PT until the end of the year)  Plan of Care/Certification Expiration Date: 23      Visit Count:  1    OUTPATIENT PHYSICAL THERAPY: Treatment Note 2023       Episode  }Appt Desk             Treatment Diagnosis:    S/P total knee arthroplasty, right  Acute pain of right knee  Stiffness of right knee, not elsewhere classified  Medical/Referring Diagnosis:      Referring Physician:  Yaa Oreilly MD MD Orders:  PT Eval and Treat   Date of Onset:  Onset Date: 10/27/23 (DOS)     Allergies:   Patient has no known allergies. Restrictions/Precautions:  Restrictions/Precautions: None  Right Lower Extremity Weight Bearing: Weight Bearing As Tolerated     Interventions Planned (Treatment may consist of any combination of the following):    Current Treatment Recommendations: Strengthening; ROM; Balance training; Manual; Pain management; Return to work related activity; Home exercise program; Modalities; Therapeutic activities     >Subjective Comments: see initial evaluation     >Initial:     6/10>Post Session:       6/10  Medications Last Reviewed:  2023  Updated Objective Findings:  see initial evaluation  Treatment     THERAPEUTIC EXERCISE: (15 minutes):    Exercises per grid below to improve mobility, strength, balance, and coordination. Progressed resistance and repetitions as indicated.      Date:  2023 Date:   Date:     Activity/Exercise Parameters Parameters Parameters   Edu Diagnosis, prognosis, POC, HEP, anatomy/physiology of condition       Gastroc stretch, heel prop 3x30

## 2023-11-22 DIAGNOSIS — F51.04 CHRONIC INSOMNIA: ICD-10-CM

## 2023-11-22 RX ORDER — TRAZODONE HYDROCHLORIDE 50 MG/1
50 TABLET ORAL NIGHTLY
Qty: 30 TABLET | Refills: 3 | OUTPATIENT
Start: 2023-11-22

## 2023-11-28 ENCOUNTER — HOSPITAL ENCOUNTER (OUTPATIENT)
Dept: PHYSICAL THERAPY | Age: 58
Setting detail: RECURRING SERIES
Discharge: HOME | End: 2023-12-01
Payer: COMMERCIAL

## 2023-11-28 PROCEDURE — 97110 THERAPEUTIC EXERCISES: CPT

## 2023-11-28 NOTE — PROGRESS NOTES
Andrey Knee Desiree  : 1965  Primary: Cheri  Sc ([place] [facility])  Secondary:  [address] @ [address] [facility] [address]  Phone: [phone]  Fax: [phone] Plan Frequency: 2x/wk, 5 weeks (only able to do PT until the end of the year)  Plan of Care/Certification Expiration Date: 23      >PT Visit Info:  Plan Frequency: 2x/wk, 5 weeks (only able to do PT until the end of the year)  Plan of Care/Certification Expiration Date: 23      Visit Count:  2    OUTPATIENT PHYSICAL THERAPY: Treatment Note 2023       Episode  }Appt Desk             Treatment Diagnosis:    No data found  Medical/Referring Diagnosis:      Referring Physician:  Reese Villa MD MD Orders:  PT Eval and Treat   Date of Onset:  Onset Date: 10/27/23 (DOS)     Allergies:   Patient has no known allergies. Restrictions/Precautions:  Restrictions/Precautions: None  Right Lower Extremity Weight Bearing: Weight Bearing As Tolerated     Interventions Planned (Treatment may consist of any combination of the following):    Current Treatment Recommendations: Strengthening; ROM; Balance training; Manual; Pain management; Return to work related activity; Home exercise program; Modalities; Therapeutic activities     >Subjective Comments: Knee's stiff and attributes it to the weather. The flexion based exercises make the knee ache for about an hour or two afterward. >Initial: does not rate     /10>Post Session:     does not rate   /10  Medications Last Reviewed:  2023  Updated Objective Findings:  see initial evaluation  Treatment     THERAPEUTIC EXERCISE: (45 minutes):    Exercises per grid below to improve mobility, strength, balance, and coordination. Progressed resistance and repetitions as indicated.      Date:  2023 Date:  23 Date:     Activity/Exercise Parameters Parameters Parameters   Edu Diagnosis, prognosis, POC, HEP, anatomy/physiology of

## 2023-11-29 ENCOUNTER — OFFICE VISIT (OUTPATIENT)
Dept: ORTHOPEDIC SURGERY | Age: 58
End: 2023-11-29

## 2023-11-29 DIAGNOSIS — Z09 FOLLOW-UP EXAMINATION: ICD-10-CM

## 2023-11-29 DIAGNOSIS — M17.11 PRIMARY OSTEOARTHRITIS OF RIGHT KNEE: Primary | ICD-10-CM

## 2023-11-29 DIAGNOSIS — Z96.651 HISTORY OF TOTAL KNEE ARTHROPLASTY, RIGHT: ICD-10-CM

## 2023-11-29 PROCEDURE — 99024 POSTOP FOLLOW-UP VISIT: CPT | Performed by: PHYSICIAN ASSISTANT

## 2023-11-29 RX ORDER — OXYCODONE HYDROCHLORIDE 5 MG/1
5-10 TABLET ORAL EVERY 4 HOURS PRN
Qty: 60 TABLET | Refills: 0 | Status: SHIPPED | OUTPATIENT
Start: 2023-11-29 | End: 2023-12-06

## 2023-11-29 RX ORDER — METHOCARBAMOL 750 MG/1
TABLET, FILM COATED ORAL
Qty: 40 TABLET | Refills: 0 | Status: SHIPPED | OUTPATIENT
Start: 2023-11-29

## 2023-11-29 NOTE — PROGRESS NOTES
Name: Berto Ramírez  YOB: 1965  Gender: male  MRN: [id number]    RIGHT Post-Op TKA 4 week follow up    This patient returns now four week status post s/p TKA. Things have gone reasonably well with therapy and the patient is now weaning from the cane. The pain level has improved. There has been no significant problem since the patient was last seen. On exam, the incision is healing approriately. ROM is 5 to 95. The patient has minimal antalgia in stance and good alignment in stance. Radiographs are obtained. Standing AP, flexion lateral and sunrise views of the RIGHT knee demonstrates well positioned TKA with good bone implant interfaces. No significant abnormalities are seen. RADIOGRAPHIC IMPRESSION: Stable RIGHT TKA. CLINICAL IMPRESSION AND PLAN: Now four weeks s/p TKA . The patient is doing reasonably well. I have made recommendations about activity. We will ask the patient to continue to wean from the cane. Recommendations for further therapy include OUTPATIENT THERAPY FOR 3-4 WEEKS. The patient will follow back up in 3 weeks for ROM recheck.      Work/Activity Restrictions: NOT APPLICABLE    GISELLE Paz

## 2023-11-30 ENCOUNTER — HOSPITAL ENCOUNTER (OUTPATIENT)
Dept: PHYSICAL THERAPY | Age: 58
Setting detail: RECURRING SERIES
End: 2023-11-30
Payer: COMMERCIAL

## 2023-11-30 PROCEDURE — 97110 THERAPEUTIC EXERCISES: CPT

## 2023-11-30 NOTE — PROGRESS NOTES
Tee Frederickjulia  : 1965  Primary: Andria Zion Fleming ([facility])  Secondary:  [address] [facility] [address]  Phone: [phone]  Fax: [phone] Plan Frequency: 2x/wk, 5 weeks (only able to do PT until the end of the year)  Plan of Care/Certification Expiration Date: 23      >PT Visit Info:  Plan Frequency: 2x/wk, 5 weeks (only able to do PT until the end of the year)  Plan of Care/Certification Expiration Date: 23      Visit Count:  3    OUTPATIENT PHYSICAL THERAPY: Treatment Note 2023       Episode  }Appt Desk             Treatment Diagnosis:    No data found  Medical/Referring Diagnosis:      Referring Physician:  Kelly Villasenor MD MD Orders:  PT Eval and Treat   Date of Onset:  Onset Date: 10/27/23 (DOS)     Allergies:   Patient has no known allergies. Restrictions/Precautions:  Restrictions/Precautions: None  Right Lower Extremity Weight Bearing: Weight Bearing As Tolerated     Interventions Planned (Treatment may consist of any combination of the following):    Current Treatment Recommendations: Strengthening; ROM; Balance training; Manual; Pain management; Return to work related activity; Home exercise program; Modalities; Therapeutic activities     >Subjective Comments: Quads were sore and achy after last session. Still sore. >Initial: does not rate     /10>Post Session:     does not rate   /10  Medications Last Reviewed:  2023  Updated Objective Findings:  see initial evaluation  Treatment     THERAPEUTIC EXERCISE: (39 minutes):    Exercises per grid below to improve mobility, strength, balance, and coordination. Progressed resistance and repetitions as indicated.      Date:  2023 Date:  23 Date:  23   Activity/Exercise Parameters Parameters Parameters   Edu Diagnosis, prognosis, POC, HEP, anatomy/physiology of condition       Gastroc stretch, heel prop 3x30 sec (Incline calf provider  Equipment provided today:  HEP handout  Recommendations/Intent for next treatment session: Next visit will focus on knee ROM, quad strengthening, functional movements.     >Total Treatment Billable Duration:  39 minutes   Time In: 1117  Time Out: [address] [facility], PT       Charge Capture  }Post Session Pain  PT Visit Info  [employer] Portal  MD Guidelines  Scanned Media  Benefits  MyChart    Future Appointments   Date Time Provider [address]   12/5/2023 10:15 AM Donice Hug, PT [facility]   12/7/2023 10:30 AM Donice Hug, PT [facility]   12/12/2023 11:15 AM Donice Hug, PT [facility]   12/14/2023 11:15 AM Donice Hug, PT [facility]   12/18/2023 10:15 AM Manasa Maria MD [facility]   12/19/2023 10:15 AM Donanais Hug, PT [facility]   12/20/2023  3:15 PM MD [facility]   12/21/2023  1:00 PM Donanais Hillg, PT [facility]   12/26/2023 11:15 AM Donanais Childers, PT [facility]   12/28/2023 11:15 AM Srikanth Ross, PT [facility]

## 2023-12-05 ENCOUNTER — HOSPITAL ENCOUNTER (OUTPATIENT)
Dept: PHYSICAL THERAPY | Age: 58
Setting detail: RECURRING SERIES
Discharge: HOME | End: 2023-12-08
Payer: COMMERCIAL

## 2023-12-05 PROCEDURE — 97110 THERAPEUTIC EXERCISES: CPT

## 2023-12-05 NOTE — PROGRESS NOTES
Gastroc stretch, heel prop 3x30 sec (Incline calf raises) 3x10 (Incline calf raises) 3x10 (Incline calf raises) [id number]   SAQ/SLR X20, 5 sec hold      LTR  3x10, increasing knee flexion      bridges 3x10, increasing knee flexion      Nustep  8 min, level 5 8 min, level 6 5 min, 5 min bike full revolutions backward with some compensation   Stair stretch with extension OP  x20 x20    Deadlift    7o89n46 lbs   Cable pulls  X10x20 lbs (Sled) 4 laps, 80 lbs (Sled) 4 laps, 95 lbs   Vasquez's carries  3 large laps, 15 lbs 4 large laps, 15 lbs 3 large laps, 20 lbs   UE assisted deep squats  3x10, 16 inch 1x10, 16 inch  7f55v16 inch 3a35e49 inch  1x10, 12 inch   Stool scoots  3x30 feet 2x60 feet 2x60 feet   STS   Next session 2r67x04 lbs   Step ups    1x10  8d00y35 lbs contralateral side   Shuttle DL  3x10, 2 cords 3x10, 2 cords DL, increasing knee flexion each set    SL next session        THERAPEUTIC ACTIVITY: ( 0 minutes): Therapeutic activities per grid below to improve mobility, strength, coordination, and dynamic movement to improve functional lifting, carrying, reaching, catching, and overhead activities. Date:  12/5/2023 Date:   Date:     Activity/Exercise Parameters Parameters Parameters                                                 MANUAL THERAPY: (0 minutes):   Joint mobilization, Soft tissue mobilization, and Manipulation was utilized and necessary because of the patient's restricted joint motion, painful spasm, loss of articular motion, and restricted motion of soft tissue.               Date  12/5/2023      Technique Used Grade Level # Time(s) Effect while being performed                                                                                         HEP Log Date    see 11/21/23    2.     3.    4.     5.        POC    Recertification Expiration Date      Plan of Care/Certification Expiration Date: 12/29/23     Visit Count  4    Number of Allowed Visits                Treatment/Session Summary:

## 2023-12-07 ENCOUNTER — HOSPITAL ENCOUNTER (OUTPATIENT)
Dept: PHYSICAL THERAPY | Age: 58
Setting detail: RECURRING SERIES
Discharge: HOME | End: 2023-12-10
Payer: COMMERCIAL

## 2023-12-07 PROCEDURE — 97110 THERAPEUTIC EXERCISES: CPT

## 2023-12-07 NOTE — PROGRESS NOTES
Leonardo Ramírez  : 1965  Primary: Bill Lucas Sc ([facility]  Secondary:  [address] [facility] [address]  Phone: [phone]  Fax: [phone] Plan Frequency: 2x/wk, 5 weeks (only able to do PT until the end of the year)  Plan of Care/Certification Expiration Date: 23      >PT Visit Info:  Plan Frequency: 2x/wk, 5 weeks (only able to do PT until the end of the year)  Plan of Care/Certification Expiration Date: 23      Visit Count:  5    OUTPATIENT PHYSICAL THERAPY: Treatment Note 2023       Episode  }Appt Desk             Treatment Diagnosis:    No data found  Medical/Referring Diagnosis:      Referring Physician:  Eli Fernandes MD MD Orders:  PT Eval and Treat   Date of Onset:  Onset Date: 10/27/23 (DOS)     Allergies:   Patient has no known allergies. Restrictions/Precautions:  Restrictions/Precautions: None  Right Lower Extremity Weight Bearing: Weight Bearing As Tolerated     Interventions Planned (Treatment may consist of any combination of the following):    Current Treatment Recommendations: Strengthening; ROM; Balance training; Manual; Pain management; Return to work related activity; Home exercise program; Modalities; Therapeutic activities     >Subjective Comments: Had some increased pain after last session. >Initial: does not rate     /10>Post Session:     does not rate   /10  Medications Last Reviewed:  2023  Updated Objective Findings:  full forward revolutions on bike  Treatment     THERAPEUTIC EXERCISE: (40 minutes):    Exercises per grid below to improve mobility, strength, balance, and coordination. Progressed resistance and repetitions as indicated.      Date:  2023 Date:  23 Date:  23 Date  23 Date  23   Activity/Exercise Parameters Parameters Parameters     Edu Diagnosis, prognosis, POC, HEP, anatomy/physiology of condition         Gastroc stretch, heel prop

## 2023-12-12 ENCOUNTER — HOSPITAL ENCOUNTER (OUTPATIENT)
Dept: PHYSICAL THERAPY | Age: 58
Setting detail: RECURRING SERIES
Discharge: HOME | End: 2023-12-15
Payer: COMMERCIAL

## 2023-12-12 PROCEDURE — 97110 THERAPEUTIC EXERCISES: CPT

## 2023-12-12 NOTE — PROGRESS NOTES
Naty Ramírez  : 1965  Primary: Evangelina Arguello Sc ([facility])  Secondary:  [address] [facility] [address]  Phone: [phone]  Fax: [phone] Plan Frequency: 2x/wk, 5 weeks (only able to do PT until the end of the year)  Plan of Care/Certification Expiration Date: 23      >PT Visit Info:  Plan Frequency: 2x/wk, 5 weeks (only able to do PT until the end of the year)  Plan of Care/Certification Expiration Date: 23      Visit Count:  6    OUTPATIENT PHYSICAL THERAPY: Treatment Note 2023       Episode  }Appt Desk             Treatment Diagnosis:    No data found  Medical/Referring Diagnosis:      Referring Physician:  Cory Raymond MD MD Orders:  PT Eval and Treat   Date of Onset:  Onset Date: 10/27/23 (DOS)     Allergies:   Patient has no known allergies. Restrictions/Precautions:  Restrictions/Precautions: None  Right Lower Extremity Weight Bearing: Weight Bearing As Tolerated     Interventions Planned (Treatment may consist of any combination of the following):    Current Treatment Recommendations: Strengthening; ROM; Balance training; Manual; Pain management; Return to work related activity; Home exercise program; Modalities; Therapeutic activities     >Subjective Comments: Knee did well with the traveling over the weekend - did a lot of walking in the airport. >Initial: does not rate     /10>Post Session:     does not rate   /10  Medications Last Reviewed:  2023  Updated Objective Findings:  full forward revolutions on bike  Treatment     THERAPEUTIC EXERCISE: (44 minutes):    Exercises per grid below to improve mobility, strength, balance, and coordination. Progressed resistance and repetitions as indicated.      Date:  2023 Date:  23 Date:  23 Date  23 Date  23 Date  23   Activity/Exercise Parameters Parameters Parameters      Edu Diagnosis, prognosis, POC, HEP,

## 2023-12-21 ENCOUNTER — HOSPITAL ENCOUNTER (OUTPATIENT)
Dept: PHYSICAL THERAPY | Age: 58
Setting detail: RECURRING SERIES
Discharge: HOME | End: 2023-12-24
Payer: COMMERCIAL

## 2023-12-21 PROCEDURE — 97110 THERAPEUTIC EXERCISES: CPT

## 2023-12-21 NOTE — PROGRESS NOTES
Ayala Frederickjulia  : 1965  Primary: Kevin Herrera Sc ([facility])  Secondary:  [address] [facility] [address]  Phone: [phone]  Fax: [phone] Plan Frequency: 2x/wk, 5 weeks (only able to do PT until the end of the year)  Plan of Care/Certification Expiration Date: 23      >PT Visit Info:  Plan Frequency: 2x/wk, 5 weeks (only able to do PT until the end of the year)  Plan of Care/Certification Expiration Date: 23      Visit Count:  9    OUTPATIENT PHYSICAL THERAPY: Treatment Note 2023       Episode  }Appt Desk             Treatment Diagnosis:    No data found  Medical/Referring Diagnosis:      Referring Physician:  Karen Causey MD MD Orders:  PT Eval and Treat   Date of Onset:  Onset Date: 10/27/23 (DOS)     Allergies:   Patient has no known allergies. Restrictions/Precautions:  Restrictions/Precautions: None  Right Lower Extremity Weight Bearing: Weight Bearing As Tolerated     Interventions Planned (Treatment may consist of any combination of the following):    Current Treatment Recommendations: Strengthening; ROM; Balance training; Manual; Pain management; Return to work related activity; Home exercise program; Modalities; Therapeutic activities     >Subjective Comments: Back has been sore and achy but it's better today than yesterday. >Initial: does not rate     /10>Post Session:     does not rate   /10  Medications Last Reviewed:  2023  Updated Objective Findings:  none today  Treatment     THERAPEUTIC EXERCISE: (45  minutes):    Exercises per grid below to improve mobility, strength, balance, and coordination. Progressed resistance and repetitions as indicated.      Date:  23 Date  23 Date  23 Date  23 Date  23 Date  23 Date  23   Activity/Exercise Parameters         Edu     Discussion of reps in reserve concept and rep ranges to accomplish specific

## 2023-12-23 PROBLEM — Z00.00 ANNUAL PHYSICAL EXAM: Status: ACTIVE | Noted: 2023-12-23

## 2023-12-26 ENCOUNTER — HOSPITAL ENCOUNTER (OUTPATIENT)
Dept: PHYSICAL THERAPY | Age: 58
Setting detail: RECURRING SERIES
Discharge: HOME | End: 2023-12-29
Payer: COMMERCIAL

## 2023-12-26 PROCEDURE — 97110 THERAPEUTIC EXERCISES: CPT

## 2023-12-26 NOTE — PROGRESS NOTES
Date  12/12/23 Date  12/14/23 Date  12/19/23 Date  12/21/23 Date  12/26/23   Activity/Exercise          Edu    Discussion of reps in reserve concept and rep ranges to accomplish specific muscular goals   Discussion of need for rest days and longer rest periods between sets if he's ending up at failure on the third set of squats   Gastroc stretch, heel prop (Incline calf raises) [id number] (Incline calf raises) 0r98y48 (Incline calf raises) 7t77n45 (Incline calf raises) 4t61j62      SAQ/SLR          LTR           bridges          Nustep 5 min, 5 min bike full revolutions backward with some compensation Bike, 8 min Bike, 8 min Bike, 8 min (Treadmill walking) 8 min, self-selected speed and incline (Treadmill walking) 8 min, self-selected speed and incline (Treadmill walking) 8 min, self-selected speed and incline   L stretch      2 min    Stair stretch with extension OP  x20 x20   X20/side with reach to inside X20/side   Deadlift 3t04a50 lbs 6n18a58 lbs, straight  5n25p98 lbs, straight        Cable pulls (Sled) 4 laps, 95 lbs (Sled) 4 laps, 95 lbs  (Sled) 4 laps, 100 lbs (Sled) 3 laps, 120 lbs (Sled) 3 laps, 120 lbs (Sled in circuit) 3 laps, 120 lbs   Vasquez's carries 3 large laps, 20 lbs 4 large laps, 20 lbs     (Suitcase in circuit) 2 laps/set   UE assisted deep squats 9n75c63 inch  1x10, 12 inch 2x10, 12 inch 2x10, 12 inch       Stool scoots 2x60 feet 2x60 feet        STS 1a75w26 lbs 4v75k21 lbs (box squat 20 inch)    (Barbell back squat, box)    1x8x15 lbs  1x8x25 lbs  1x8x35 lbs  3x8x40 lbs    Deadlift warm up       Supermans x10    Lat pullback deadlifts with dowel x10    X2 rounds   Conventional deadlifts       2x8x45 lbs  1x8x65 lbs  1x8x70 lbs   Step ups 1x10  1v58x38 lbs contralateral side         circuit   Box squat, x10x15 lbs  Modified burpees x8 to box with push up  Farmer's carries, 1 lap 20 lbs    3 rounds   bpm Step ups x10x10 lb  Farmer's carries, 25 lbs 1 lap 1. Burpee deadlifts, 25 lbs x10  2.

## 2023-12-28 ENCOUNTER — HOSPITAL ENCOUNTER (OUTPATIENT)
Dept: PHYSICAL THERAPY | Age: 58
Setting detail: RECURRING SERIES
Discharge: HOME | End: 2023-12-31
Payer: COMMERCIAL

## 2023-12-28 PROCEDURE — 97110 THERAPEUTIC EXERCISES: CPT

## 2023-12-28 NOTE — PROGRESS NOTES
bilateral    x3      Lateral step downs      Next session     Shuttle DL               THERAPEUTIC ACTIVITY: ( 0 minutes):    Therapeutic activities per grid below to improve mobility, strength, coordination, and dynamic movement to improve functional lifting, carrying, reaching, catching, and overhead activities.   Date:  12/28/2023 Date:   Date:     Activity/Exercise Parameters Parameters Parameters                                                 MANUAL THERAPY: (0 minutes):   Joint mobilization, Soft tissue mobilization, and Manipulation was utilized and necessary because of the patient's restricted joint motion, painful spasm, loss of articular motion, and restricted motion of soft tissue.              Date  12/28/2023      Technique Used Grade Level # Time(s) Effect while being performed                                                                                         HEP Log Date    see 11/21/23    2.     3.    4.     5.        POC    Recertification Expiration Date      Plan of Care/Certification Expiration Date: 12/29/23     Visit Count  11    Number of Allowed Visits        Treatment/Session Summary:    >Treatment Assessment: See Discharge Summary     Communication/Consultation:  See Discharge Summary  Equipment provided today:  HEP handout  Recommendations/Intent for next treatment session:See Discharge Summary    >Total Treatment Billable Duration:  45 minutes   Time In: 1115  Time Out: 1200    Alina Ross PT       Charge Capture  }Post Session Pain  PT Visit Info  [employer] Portal  MD Guidelines  Scanned Media  Benefits  MyChart    No future appointments.

## 2023-12-28 NOTE — THERAPY DISCHARGE
Buddy Ramírez  : 1965  Primary: Megan Sc ([facility])  Secondary:  [facility] [place] [address] [place] [address]  Phone: [phone]  Fax: [phone] Plan Frequency: 2x/wk, 5 weeks (only able to do PT until the end of the year)    Plan of Care/Certification Expiration Date: 23      PT Visit Info:  Plan Frequency: 2x/wk, 5 weeks (only able to do PT until the end of the year)  Plan of Care/Certification Expiration Date: 23      Visit Count:  11                OUTPATIENT PHYSICAL THERAPY:             Discharge Summary 2023               Episode (R TKA) Appt Desk         Treatment Diagnosis:    No data found  Medical/Referring Diagnosis:      Referring Physician:  Richy Arnold MD MD Orders:  PT Eval and Treat   Return MD Appt:  23  Date of Onset:  Onset Date: 10/27/23 (DOS)      Allergies:  Patient has no known allergies.  Restrictions/Precautions:    Restrictions/Precautions: None      Medications Last Reviewed:  2023     SUBJECTIVE   History of Injury/Illness (Reason for Referral):  Pt presents s/p R TKA performed by Dr. Arnold on 10/27/23 for treatment of functional deficits from knee OA. Since surgery, he's a rough time with pain though his HH PT told him he's doing really well. Not really using his cane much anymore. Pt wants to get back to going to the gym (weights and walking on the treadmill) and playing pickleball. States he can only do PT until the end of the year due to financial reasons.     Discharge: Still having stiffness after being still for a while. The knee also feels weak still and feels like it might give out - seems to be after more prolonged activity.   Patient Stated Goal(s):  \"flexibility\"  Initial:      /10 Post Session:      /10  Past Medical History/Comorbidities:   Mr. Ramírez  has a past medical history of Atypical chest pain, CAD (coronary artery disease), Chronic insomnia,

## 2024-01-18 DIAGNOSIS — Z96.651 HISTORY OF TOTAL KNEE ARTHROPLASTY, RIGHT: Primary | ICD-10-CM

## 2024-01-18 RX ORDER — AMOXICILLIN 500 MG/1
CAPSULE ORAL
Qty: 4 CAPSULE | Refills: 1 | Status: SHIPPED | OUTPATIENT
Start: 2024-01-18

## 2024-01-18 NOTE — TELEPHONE ENCOUNTER
Please call patient , going to the dentist on Jan 29 , deep cleaning, needs an antibiotic??  Same pharmacy

## 2024-01-22 PROBLEM — Z00.00 ANNUAL PHYSICAL EXAM: Status: RESOLVED | Noted: 2023-12-23 | Resolved: 2024-01-22

## 2024-01-29 DIAGNOSIS — Z96.651 HISTORY OF TOTAL KNEE ARTHROPLASTY, RIGHT: Primary | ICD-10-CM

## 2024-01-29 RX ORDER — AMOXICILLIN 500 MG/1
CAPSULE ORAL
Qty: 4 CAPSULE | Refills: 2 | Status: SHIPPED | OUTPATIENT
Start: 2024-01-29

## 2024-01-29 NOTE — TELEPHONE ENCOUNTER
He is having some dental work done. He will have three appts. Please send to [facility] @ [place].

## 2024-04-15 RX ORDER — FAMOTIDINE 40 MG/1
40 TABLET, FILM COATED ORAL
Qty: 90 TABLET | Refills: 3 | Status: SHIPPED | OUTPATIENT
Start: 2024-04-15

## 2024-04-15 NOTE — TELEPHONE ENCOUNTER
Pt needs a refill of his pepcid 40 MG please send to [facility] at [facility] in [place].

## 2024-04-15 NOTE — TELEPHONE ENCOUNTER
Requested Prescriptions     Pending Prescriptions Disp Refills    famotidine (PEPCID) 40 MG tablet 90 tablet 3     Sig: Take 1 tablet by mouth nightly     Verified rx. Last OV 08/16/23. Erx to pharm on file.

## 2024-04-16 RX ORDER — FAMOTIDINE 40 MG/1
40 TABLET, FILM COATED ORAL NIGHTLY
Qty: 30 TABLET | Refills: 11 | OUTPATIENT
Start: 2024-04-16

## 2024-05-12 NOTE — PROGRESS NOTES
Diagnosis Date Noted    Arthritis of right knee 10/27/2023     Priority: Low    Osteoarthritis of right knee 2023     Priority: Low    Acute right ankle pain 2023     Priority: Low    Primary osteoarthritis of right knee 2023     Priority: Low    Chronic insomnia 2023     Priority: Low    Seasonal allergies 2023     Priority: Low    History of coronary artery bypass graft x 3 2023     Overview Note:     Rosita:  LIMA to LAD  Saphenous graft to the R-PDA  Free radial to the OM branch      CAD (coronary artery disease) 2023    Dyslipidemia 2023    Atypical chest pain 2023       Surgical History- right knee arthroscopy x 2, hip tumor resection, ear x 2, hand x 2    Family History- Dad  at 39yo with MI, brother  39yo with MI, another brother with CABG at 61yo.     Social History     Tobacco Use    Smoking status: Never    Smokeless tobacco: Never   Substance Use Topics    Alcohol use: Yes     Alcohol/week: 6.0 standard drinks of alcohol     Types: 2 Glasses of wine, 2 Cans of beer, 2 Shots of liquor per week     Comment: Social at most       ROS:    Review of Systems   Constitutional:  Positive for fatigue. Negative for chills, fever and unexpected weight change.   HENT:  Negative for congestion, nosebleeds and tinnitus.         Snoring   Eyes:  Negative for photophobia and visual disturbance.   Respiratory:  Negative for cough, chest tightness, shortness of breath and wheezing.    Cardiovascular:  Negative for chest pain, palpitations and leg swelling.   Gastrointestinal:  Negative for abdominal distention, abdominal pain, constipation, diarrhea, nausea and vomiting.   Endocrine: Negative for cold intolerance and heat intolerance.   Genitourinary:  Negative for dysuria, frequency and hematuria.   Musculoskeletal:  Negative for arthralgias, joint swelling and myalgias.   Skin:  Negative for rash and wound.   Allergic/Immunologic: Negative for environmental

## 2024-05-16 ENCOUNTER — OFFICE VISIT (OUTPATIENT)
Age: 59
End: 2024-05-16
Payer: COMMERCIAL

## 2024-05-16 VITALS
HEIGHT: 69 IN | WEIGHT: 188.1 LBS | HEART RATE: 65 BPM | BODY MASS INDEX: 27.86 KG/M2 | SYSTOLIC BLOOD PRESSURE: 138 MMHG | DIASTOLIC BLOOD PRESSURE: 75 MMHG

## 2024-05-16 DIAGNOSIS — Z95.1 HISTORY OF CORONARY ARTERY BYPASS GRAFT X 3: ICD-10-CM

## 2024-05-16 DIAGNOSIS — E78.5 DYSLIPIDEMIA: ICD-10-CM

## 2024-05-16 DIAGNOSIS — I25.10 CORONARY ARTERY DISEASE INVOLVING NATIVE CORONARY ARTERY OF NATIVE HEART WITHOUT ANGINA PECTORIS: Primary | ICD-10-CM

## 2024-05-16 DIAGNOSIS — I25.10 CORONARY ARTERY DISEASE INVOLVING NATIVE CORONARY ARTERY OF NATIVE HEART WITHOUT ANGINA PECTORIS: ICD-10-CM

## 2024-05-16 LAB
ALBUMIN SERPL-MCNC: 4.4 G/DL (ref 3.2–4.6)
ALBUMIN/GLOB SERPL: 1.6 (ref 1–1.9)
ALP SERPL-CCNC: 54 U/L (ref 40–129)
ALT SERPL-CCNC: 24 U/L (ref 12–65)
AST SERPL-CCNC: 34 U/L (ref 15–37)
BILIRUB DIRECT SERPL-MCNC: <0.2 MG/DL (ref 0–0.4)
BILIRUB SERPL-MCNC: 0.5 MG/DL (ref 0–1.2)
CHOLEST SERPL-MCNC: 145 MG/DL (ref 0–200)
GLOBULIN SER CALC-MCNC: 2.8 G/DL (ref 2.3–3.5)
HDLC SERPL-MCNC: 44 MG/DL (ref 40–60)
HDLC SERPL: 3.3 (ref 0–5)
LDLC SERPL CALC-MCNC: 74 MG/DL (ref 0–100)
PROT SERPL-MCNC: 7.2 G/DL (ref 6.3–8.2)
TRIGL SERPL-MCNC: 138 MG/DL (ref 0–150)
VLDLC SERPL CALC-MCNC: 28 MG/DL (ref 6–23)

## 2024-05-16 PROCEDURE — 99214 OFFICE O/P EST MOD 30 MIN: CPT | Performed by: INTERNAL MEDICINE

## 2024-05-16 PROCEDURE — 93000 ELECTROCARDIOGRAM COMPLETE: CPT | Performed by: INTERNAL MEDICINE

## 2024-05-16 RX ORDER — OMEPRAZOLE 40 MG/1
40 CAPSULE, DELAYED RELEASE ORAL DAILY
Qty: 90 CAPSULE | Refills: 3 | Status: SHIPPED | OUTPATIENT
Start: 2024-05-16

## 2024-05-16 RX ORDER — FAMOTIDINE 40 MG/1
40 TABLET, FILM COATED ORAL
Qty: 90 TABLET | Refills: 3 | Status: SHIPPED | OUTPATIENT
Start: 2024-05-16

## 2024-05-16 RX ORDER — ROSUVASTATIN CALCIUM 40 MG/1
40 TABLET, COATED ORAL NIGHTLY
Qty: 90 TABLET | Refills: 3 | Status: SHIPPED | OUTPATIENT
Start: 2024-05-16

## 2024-05-16 RX ORDER — METOPROLOL SUCCINATE 50 MG/1
50 TABLET, EXTENDED RELEASE ORAL DAILY
Qty: 90 TABLET | Refills: 3 | Status: SHIPPED | OUTPATIENT
Start: 2024-05-16

## 2024-05-16 NOTE — RESULT ENCOUNTER NOTE
Lipid profile reviewed, everything looks good.  Continue meds and healthy diet lifestyle, keep routine follow-up.

## 2024-05-20 DIAGNOSIS — F51.04 CHRONIC INSOMNIA: ICD-10-CM

## 2024-05-20 NOTE — TELEPHONE ENCOUNTER
You can fill them both, give 90 pills with 3 refills on the atorvastatin, give 90 pills with no refills on the trazodone.  This needs to be refilled in the future by his PCP who needs to manage this.  This is a noncardiac drug.

## 2024-05-20 NOTE — TELEPHONE ENCOUNTER
Pt is calling upset saying these 2 meds were supposed to be called in last week with the others  And pt is out and going out of town so he is requesting these be called in Today  ASAP ,Please call pt when this has been done              MEDICATION REFILL REQUEST      Name of Medication:  Rosuvastatin  Dose:  40 mg  Frequency:  QD  Quantity:  90  Days' supply:  90 with 3 refills    Pharmacy Name/Location:  [phone]    MEDICATION REFILL REQUEST      Name of Medication:  Trazodone  Dose:  50 mg  Frequency:  QD  Quantity:  90  Days' supply:  90 with 3 refills      Pharmacy Name/Location:  [phone]

## 2024-05-21 RX ORDER — ROSUVASTATIN CALCIUM 40 MG/1
40 TABLET, COATED ORAL NIGHTLY
Qty: 90 TABLET | Refills: 3 | Status: SHIPPED | OUTPATIENT
Start: 2024-05-21

## 2024-05-21 RX ORDER — TRAZODONE HYDROCHLORIDE 50 MG/1
50 TABLET ORAL NIGHTLY
Qty: 90 TABLET | Refills: 0 | Status: SHIPPED | OUTPATIENT
Start: 2024-05-21

## 2024-08-10 DIAGNOSIS — F51.04 CHRONIC INSOMNIA: ICD-10-CM

## 2024-08-12 RX ORDER — TRAZODONE HYDROCHLORIDE 50 MG/1
50 TABLET, FILM COATED ORAL NIGHTLY
Qty: 90 TABLET | Refills: 1 | OUTPATIENT
Start: 2024-08-12

## 2024-08-27 DIAGNOSIS — F51.04 CHRONIC INSOMNIA: ICD-10-CM

## 2024-08-27 RX ORDER — TRAZODONE HYDROCHLORIDE 50 MG/1
50 TABLET, FILM COATED ORAL NIGHTLY
Qty: 90 TABLET | Refills: 0 | Status: SHIPPED | OUTPATIENT
Start: 2024-08-27

## 2024-09-11 ENCOUNTER — OFFICE VISIT (OUTPATIENT)
Dept: ORTHOPEDIC SURGERY | Age: 59
End: 2024-09-11

## 2024-09-11 DIAGNOSIS — Z96.651 PRESENCE OF TOTAL KNEE JOINT PROSTHESIS, RIGHT: ICD-10-CM

## 2024-09-11 DIAGNOSIS — Z09 SURGERY FOLLOW-UP: ICD-10-CM

## 2024-09-11 DIAGNOSIS — Z96.651 HISTORY OF TOTAL KNEE ARTHROPLASTY, RIGHT: Primary | ICD-10-CM

## 2024-09-11 RX ORDER — DICLOFENAC SODIUM 75 MG/1
75 TABLET, DELAYED RELEASE ORAL 2 TIMES DAILY
Qty: 60 TABLET | Refills: 0 | Status: SHIPPED | OUTPATIENT
Start: 2024-09-11

## 2024-11-09 DIAGNOSIS — F51.04 CHRONIC INSOMNIA: ICD-10-CM

## 2024-11-11 RX ORDER — TRAZODONE HYDROCHLORIDE 50 MG/1
50 TABLET, FILM COATED ORAL NIGHTLY
Qty: 90 TABLET | Refills: 1 | OUTPATIENT
Start: 2024-11-11

## 2025-03-25 ENCOUNTER — OFFICE VISIT (OUTPATIENT)
Dept: FAMILY MEDICINE CLINIC | Facility: CLINIC | Age: 60
End: 2025-03-25
Payer: COMMERCIAL

## 2025-03-25 VITALS — WEIGHT: 189 LBS | HEIGHT: 69 IN | BODY MASS INDEX: 27.99 KG/M2

## 2025-03-25 DIAGNOSIS — I25.10 CORONARY ARTERY DISEASE INVOLVING NATIVE CORONARY ARTERY OF NATIVE HEART WITHOUT ANGINA PECTORIS: ICD-10-CM

## 2025-03-25 DIAGNOSIS — Z00.00 ANNUAL PHYSICAL EXAM: ICD-10-CM

## 2025-03-25 DIAGNOSIS — Z13.6 SCREENING FOR HEART DISEASE: ICD-10-CM

## 2025-03-25 DIAGNOSIS — Z12.5 SCREENING FOR PROSTATE CANCER: ICD-10-CM

## 2025-03-25 DIAGNOSIS — Z00.00 ENCOUNTER FOR ANNUAL PHYSICAL EXAM: ICD-10-CM

## 2025-03-25 DIAGNOSIS — Z28.39 IMMUNIZATION DEFICIENCY: ICD-10-CM

## 2025-03-25 DIAGNOSIS — N52.8 OTHER MALE ERECTILE DYSFUNCTION: ICD-10-CM

## 2025-03-25 DIAGNOSIS — F51.04 CHRONIC INSOMNIA: Primary | ICD-10-CM

## 2025-03-25 PROCEDURE — 90471 IMMUNIZATION ADMIN: CPT | Performed by: FAMILY MEDICINE

## 2025-03-25 PROCEDURE — 90715 TDAP VACCINE 7 YRS/> IM: CPT | Performed by: FAMILY MEDICINE

## 2025-03-25 PROCEDURE — 99214 OFFICE O/P EST MOD 30 MIN: CPT | Performed by: FAMILY MEDICINE

## 2025-03-25 RX ORDER — SILDENAFIL 100 MG/1
50 TABLET, FILM COATED ORAL DAILY PRN
Qty: 30 TABLET | Refills: 5 | Status: SHIPPED | OUTPATIENT
Start: 2025-03-25

## 2025-03-25 RX ORDER — TRAZODONE HYDROCHLORIDE 50 MG/1
50 TABLET ORAL NIGHTLY
Qty: 90 TABLET | Refills: 1 | Status: SHIPPED | OUTPATIENT
Start: 2025-03-25

## 2025-03-25 RX ORDER — TRAZODONE HYDROCHLORIDE 50 MG/1
50 TABLET ORAL NIGHTLY
Qty: 90 TABLET | Refills: 3 | Status: SHIPPED | OUTPATIENT
Start: 2025-03-25 | End: 2025-03-25

## 2025-03-25 SDOH — ECONOMIC STABILITY: FOOD INSECURITY: WITHIN THE PAST 12 MONTHS, THE FOOD YOU BOUGHT JUST DIDN'T LAST AND YOU DIDN'T HAVE MONEY TO GET MORE.: NEVER TRUE

## 2025-03-25 SDOH — ECONOMIC STABILITY: FOOD INSECURITY: WITHIN THE PAST 12 MONTHS, YOU WORRIED THAT YOUR FOOD WOULD RUN OUT BEFORE YOU GOT MONEY TO BUY MORE.: NEVER TRUE

## 2025-03-25 ASSESSMENT — ENCOUNTER SYMPTOMS
SORE THROAT: 0
EYE DISCHARGE: 0
EYE REDNESS: 0
SHORTNESS OF BREATH: 0
ABDOMINAL PAIN: 0
BLOOD IN STOOL: 0
VOMITING: 0
EYE ITCHING: 0
COLOR CHANGE: 0
ABDOMINAL DISTENTION: 0
SINUS PRESSURE: 0
BACK PAIN: 0
WHEEZING: 0
RHINORRHEA: 0
COUGH: 0
FACIAL SWELLING: 0
CHEST TIGHTNESS: 0
STRIDOR: 0
NAUSEA: 0
SINUS PAIN: 0
DIARRHEA: 0
EYE PAIN: 0
CONSTIPATION: 0

## 2025-03-25 ASSESSMENT — PATIENT HEALTH QUESTIONNAIRE - PHQ9
SUM OF ALL RESPONSES TO PHQ QUESTIONS 1-9: 0
SUM OF ALL RESPONSES TO PHQ QUESTIONS 1-9: 0
1. LITTLE INTEREST OR PLEASURE IN DOING THINGS: NOT AT ALL
SUM OF ALL RESPONSES TO PHQ QUESTIONS 1-9: 0
2. FEELING DOWN, DEPRESSED OR HOPELESS: NOT AT ALL
SUM OF ALL RESPONSES TO PHQ QUESTIONS 1-9: 0

## 2025-03-25 NOTE — PROGRESS NOTES
[facility]  _______________________________________  Kee Benitez MD                 [address] [place] [address]        Buddy [place] [address]                                                                                    Phone: [phone]                                                                                    Fax: [phone]    Buddy Ramírez is a 60 y.o. male who is seen for evaluation of   Chief Complaint   Patient presents with    Insomnia     Refill for trazodone    Other     Daughter having a baby, needs a tdap        HPI:   Mr. Rivera is seen today for f/u.     - CAD- s/p 3 stents. S/p plavix. On asa. No recent s/s.     - c/o difficulty with ED. + desire. Difficulty achieving/maintaining erections.     - GERD- improved with omeprazole and famotidine.     -insomnia- improved on trazodone.         Lab Results   Component Value Date    ALT 24 05/16/2024    AST 34 05/16/2024    ALKPHOS 54 05/16/2024    BILITOT 0.5 05/16/2024     Lab Results   Component Value Date    CHOL 145 05/16/2024    TRIG 138 05/16/2024    HDL 44 05/16/2024    LDL 74 05/16/2024    VLDL 28 (H) 05/16/2024    CHOLHDLRATIO 3.3 05/16/2024         Review of Systems:  Review of Systems   Constitutional:  Negative for activity change, appetite change, chills, diaphoresis, fatigue, fever and unexpected weight change.   HENT:  Negative for congestion, ear discharge, ear pain, facial swelling, hearing loss, mouth sores, nosebleeds, postnasal drip, rhinorrhea, sinus pressure, sinus pain, sore throat and tinnitus.    Eyes:  Negative for pain, discharge, redness, itching and visual disturbance.   Respiratory:  Negative for cough, chest tightness, shortness of breath, wheezing and stridor.    Cardiovascular:  Negative for chest pain, palpitations and leg swelling.   Gastrointestinal:  Negative for abdominal distention, abdominal pain, blood in stool, constipation,

## 2025-05-01 ENCOUNTER — TELEPHONE (OUTPATIENT)
Age: 60
End: 2025-05-01

## 2025-05-01 NOTE — TELEPHONE ENCOUNTER
Called patient and told him that Dr. Maria placed lab orders in March and to have those labs drawn. Patient agreed and verbalized understanding.

## 2025-05-02 DIAGNOSIS — Z00.00 ENCOUNTER FOR ANNUAL PHYSICAL EXAM: ICD-10-CM

## 2025-05-02 DIAGNOSIS — Z13.6 SCREENING FOR HEART DISEASE: ICD-10-CM

## 2025-05-02 DIAGNOSIS — Z12.5 SCREENING FOR PROSTATE CANCER: ICD-10-CM

## 2025-05-02 DIAGNOSIS — Z00.00 ANNUAL PHYSICAL EXAM: ICD-10-CM

## 2025-05-02 LAB
ALBUMIN SERPL-MCNC: 3.9 G/DL (ref 3.2–4.6)
ALBUMIN/GLOB SERPL: 1.2 (ref 1–1.9)
ALP SERPL-CCNC: 52 U/L (ref 40–129)
ALT SERPL-CCNC: 28 U/L (ref 8–55)
ANION GAP SERPL CALC-SCNC: 11 MMOL/L (ref 7–16)
AST SERPL-CCNC: 32 U/L (ref 15–37)
BASOPHILS # BLD: 0.06 K/UL (ref 0–0.2)
BASOPHILS NFR BLD: 1.2 % (ref 0–2)
BILIRUB SERPL-MCNC: 0.3 MG/DL (ref 0–1.2)
BUN SERPL-MCNC: 14 MG/DL (ref 8–23)
CALCIUM SERPL-MCNC: 9.2 MG/DL (ref 8.8–10.2)
CHLORIDE SERPL-SCNC: 106 MMOL/L (ref 98–107)
CHOLEST SERPL-MCNC: 109 MG/DL (ref 0–200)
CO2 SERPL-SCNC: 24 MMOL/L (ref 20–29)
CREAT SERPL-MCNC: 1.11 MG/DL (ref 0.8–1.3)
DIFFERENTIAL METHOD BLD: ABNORMAL
EOSINOPHIL # BLD: 0.15 K/UL (ref 0–0.8)
EOSINOPHIL NFR BLD: 3 % (ref 0.5–7.8)
ERYTHROCYTE [DISTWIDTH] IN BLOOD BY AUTOMATED COUNT: 14.9 % (ref 11.9–14.6)
GLOBULIN SER CALC-MCNC: 3.2 G/DL (ref 2.3–3.5)
GLUCOSE SERPL-MCNC: 92 MG/DL (ref 70–99)
HCT VFR BLD AUTO: 37.1 % (ref 41.1–50.3)
HDLC SERPL-MCNC: 35 MG/DL (ref 40–60)
HDLC SERPL: 3.1 (ref 0–5)
HGB BLD-MCNC: 11.2 G/DL (ref 13.6–17.2)
IMM GRANULOCYTES # BLD AUTO: 0.01 K/UL (ref 0–0.5)
IMM GRANULOCYTES NFR BLD AUTO: 0.2 % (ref 0–5)
LDLC SERPL CALC-MCNC: 61 MG/DL (ref 0–100)
LYMPHOCYTES # BLD: 1.3 K/UL (ref 0.5–4.6)
LYMPHOCYTES NFR BLD: 25.6 % (ref 13–44)
MCH RBC QN AUTO: 24.2 PG (ref 26.1–32.9)
MCHC RBC AUTO-ENTMCNC: 30.2 G/DL (ref 31.4–35)
MCV RBC AUTO: 80.1 FL (ref 82–102)
MONOCYTES # BLD: 0.52 K/UL (ref 0.1–1.3)
MONOCYTES NFR BLD: 10.3 % (ref 4–12)
NEUTS SEG # BLD: 3.03 K/UL (ref 1.7–8.2)
NEUTS SEG NFR BLD: 59.7 % (ref 43–78)
NRBC # BLD: 0 K/UL (ref 0–0.2)
PLATELET # BLD AUTO: 329 K/UL (ref 150–450)
PMV BLD AUTO: 9.5 FL (ref 9.4–12.3)
POTASSIUM SERPL-SCNC: 4.2 MMOL/L (ref 3.5–5.1)
PROT SERPL-MCNC: 7 G/DL (ref 6.3–8.2)
PSA SERPL-MCNC: 0.8 NG/ML (ref 0–4)
RBC # BLD AUTO: 4.63 M/UL (ref 4.23–5.6)
SODIUM SERPL-SCNC: 141 MMOL/L (ref 136–145)
TRIGL SERPL-MCNC: 67 MG/DL (ref 0–150)
TSH, 3RD GENERATION: 4.54 UIU/ML (ref 0.27–4.2)
VLDLC SERPL CALC-MCNC: 13 MG/DL (ref 6–23)
WBC # BLD AUTO: 5.1 K/UL (ref 4.3–11.1)

## 2025-05-04 NOTE — PROGRESS NOTES
coronary artery bypass graft x 3 2023     Overview Note:     Rosita:  LIMA to LAD  Saphenous graft to the R-PDA  Free radial to the OM branch      CAD (coronary artery disease) 2023    Dyslipidemia 2023    Atypical chest pain 2023       Surgical History- right knee arthroscopy x 2, hip tumor resection, ear x 2, hand x 2    Family History- Dad  at 39yo with MI, brother  37yo with MI, another brother with CABG at 61yo.     Social History     Tobacco Use    Smoking status: Never    Smokeless tobacco: Never   Substance Use Topics    Alcohol use: Yes     Alcohol/week: 6.0 standard drinks of alcohol     Types: 2 Glasses of wine, 2 Cans of beer, 2 Shots of liquor per week     Comment: Social at most       ROS:    Review of Systems   Constitutional:  Positive for fatigue. Negative for chills, fever and unexpected weight change.   HENT:  Negative for congestion, nosebleeds and tinnitus.         Snoring   Eyes:  Negative for photophobia and visual disturbance.   Respiratory:  Negative for cough, chest tightness, shortness of breath and wheezing.    Cardiovascular:  Negative for chest pain, palpitations and leg swelling.   Gastrointestinal:  Negative for abdominal distention, abdominal pain, constipation, diarrhea, nausea and vomiting.   Endocrine: Negative for cold intolerance and heat intolerance.   Genitourinary:  Negative for dysuria, frequency and hematuria.   Musculoskeletal:  Negative for arthralgias, joint swelling and myalgias.   Skin:  Negative for rash and wound.   Allergic/Immunologic: Negative for environmental allergies.   Neurological:  Negative for dizziness, seizures, syncope and light-headedness.   Hematological:  Negative for adenopathy. Does not bruise/bleed easily.   Psychiatric/Behavioral:  Negative for agitation, behavioral problems, confusion and hallucinations.          PHYSICAL EXAM:  Vitals:    25 1523   BP: 134/78   Pulse: 61   Weight: 84.4 kg (186 lb)   Height:

## 2025-05-05 ENCOUNTER — RESULTS FOLLOW-UP (OUTPATIENT)
Dept: FAMILY MEDICINE CLINIC | Facility: CLINIC | Age: 60
End: 2025-05-05

## 2025-05-05 ENCOUNTER — OFFICE VISIT (OUTPATIENT)
Age: 60
End: 2025-05-05
Payer: COMMERCIAL

## 2025-05-05 VITALS
SYSTOLIC BLOOD PRESSURE: 134 MMHG | HEART RATE: 61 BPM | HEIGHT: 69 IN | DIASTOLIC BLOOD PRESSURE: 78 MMHG | WEIGHT: 186 LBS | BODY MASS INDEX: 27.55 KG/M2

## 2025-05-05 DIAGNOSIS — R94.6 ABNORMAL THYROID FUNCTION TEST: ICD-10-CM

## 2025-05-05 DIAGNOSIS — R07.89 ATYPICAL CHEST PAIN: ICD-10-CM

## 2025-05-05 DIAGNOSIS — E78.5 DYSLIPIDEMIA: ICD-10-CM

## 2025-05-05 DIAGNOSIS — R94.6 ABNORMAL THYROID FUNCTION TEST: Primary | ICD-10-CM

## 2025-05-05 DIAGNOSIS — D50.9 HYPOCHROMIC ANEMIA: ICD-10-CM

## 2025-05-05 DIAGNOSIS — Z95.1 HISTORY OF CORONARY ARTERY BYPASS GRAFT X 3: ICD-10-CM

## 2025-05-05 DIAGNOSIS — I25.10 CORONARY ARTERY DISEASE INVOLVING NATIVE CORONARY ARTERY OF NATIVE HEART WITHOUT ANGINA PECTORIS: Primary | ICD-10-CM

## 2025-05-05 LAB
FERRITIN SERPL-MCNC: 13 NG/ML (ref 8–388)
IRON SATN MFR SERPL: 9 % (ref 20–50)
IRON SERPL-MCNC: 34 UG/DL (ref 35–100)
T4 FREE SERPL-MCNC: 1.2 NG/DL (ref 0.9–1.7)
TIBC SERPL-MCNC: 383 UG/DL (ref 240–450)
TRANSFERRIN SERPL-MCNC: 323 MG/DL (ref 200–360)
UIBC SERPL-MCNC: 349 UG/DL (ref 112–347)

## 2025-05-05 PROCEDURE — 93000 ELECTROCARDIOGRAM COMPLETE: CPT | Performed by: INTERNAL MEDICINE

## 2025-05-05 PROCEDURE — 99214 OFFICE O/P EST MOD 30 MIN: CPT | Performed by: INTERNAL MEDICINE

## 2025-05-05 RX ORDER — METOPROLOL SUCCINATE 50 MG/1
50 TABLET, EXTENDED RELEASE ORAL DAILY
Qty: 90 TABLET | Refills: 3 | Status: SHIPPED | OUTPATIENT
Start: 2025-05-05

## 2025-05-05 RX ORDER — ROSUVASTATIN CALCIUM 40 MG/1
40 TABLET, COATED ORAL NIGHTLY
Qty: 90 TABLET | Refills: 3 | Status: SHIPPED | OUTPATIENT
Start: 2025-05-05

## 2025-05-05 RX ORDER — FAMOTIDINE 40 MG/1
40 TABLET, FILM COATED ORAL
Qty: 90 TABLET | Refills: 3 | Status: SHIPPED | OUTPATIENT
Start: 2025-05-05

## 2025-05-05 RX ORDER — OMEPRAZOLE 40 MG/1
40 CAPSULE, DELAYED RELEASE ORAL DAILY
Qty: 90 CAPSULE | Refills: 3 | Status: SHIPPED | OUTPATIENT
Start: 2025-05-05

## (undated) DEVICE — SOLUTION IV 250ML 0.9% SOD CHL PH 5 INJ USP VIAFLX PLAS

## (undated) DEVICE — BLADE RMR L51MM PAT PILOT H

## (undated) DEVICE — BIPOLAR SEALER 23-112-1 AQM 6.0: Brand: AQUAMANTYS ®

## (undated) DEVICE — BLADE SAW W12.5XL70MM THK0.8MM CUT THK1.12MM S STL RECIP

## (undated) DEVICE — DRESSING HYDROFIBER AQUACEL AG ADVANTAGE 3.5X12 IN

## (undated) DEVICE — DRAPE,TOP,102X53,STERILE: Brand: MEDLINE

## (undated) DEVICE — SUTURE VCRL SZ 1 L27IN ABSRB UD L36MM CP-1 1/2 CIR REV CUT J268H

## (undated) DEVICE — SUTURE VCRL SZ 2-0 L27IN ABSRB UD L36MM CP-1 1/2 CIR REV J266H

## (undated) DEVICE — SUTURE STRATAFIX SPRL SZ 1 L14IN ABSRB VLT L48CM CTX 1/2 SXPD2B405

## (undated) DEVICE — CLOSURE SKIN FLX NONINVASIVE PRELOC TECHNOLOGY FOR 24IN

## (undated) DEVICE — CLOSURE SKIN FACILITATES COMPATIBILITY W/ CERTAIN IS DSG

## (undated) DEVICE — SOLUTION IRRIG 1000ML 0.9% SOD CHL USP POUR PLAS BTL

## (undated) DEVICE — SOLUTION IRRIG 3000ML 0.9% SOD CHL USP UROMATIC PLAS CONT

## (undated) DEVICE — SOLUTION IRRIG 1000ML STRL H2O USP PLAS POUR BTL

## (undated) DEVICE — TIBIAL BEARING INSERT - CS
Type: IMPLANTABLE DEVICE | Site: KNEE | Status: NON-FUNCTIONAL
Brand: TRIATHLON
Removed: 2023-10-27

## (undated) DEVICE — SYRINGE MED 50ML LUERLOCK TIP

## (undated) DEVICE — STRAP RESTRAIN W3.5XL19IN TECLIN STRRP POS LEG DURING LITH

## (undated) DEVICE — YANKAUER,FLEXIBLE HANDLE,REGLR CAPACITY: Brand: MEDLINE INDUSTRIES, INC.

## (undated) DEVICE — STERILE PVP: Brand: MEDLINE INDUSTRIES, INC.

## (undated) DEVICE — STERILE PRESSURE PROTECTOR PAD® FOR DE MAYO UNIVERSAL DISTRACTOR® (10/CASE): Brand: DE MAYO UNIVERSAL DISTRACTOR®

## (undated) DEVICE — TOTAL KNEE DR RIDGEWAY: Brand: MEDLINE INDUSTRIES, INC.